# Patient Record
Sex: MALE | Race: WHITE | Employment: UNEMPLOYED | URBAN - METROPOLITAN AREA
[De-identification: names, ages, dates, MRNs, and addresses within clinical notes are randomized per-mention and may not be internally consistent; named-entity substitution may affect disease eponyms.]

---

## 2018-07-15 ENCOUNTER — HOSPITAL ENCOUNTER (INPATIENT)
Facility: CLINIC | Age: 28
LOS: 5 days | Discharge: HOME | End: 2018-07-20
Attending: EMERGENCY MEDICINE | Admitting: PSYCHIATRY & NEUROLOGY
Payer: MEDICAID

## 2018-07-15 DIAGNOSIS — F10.20 ALCOHOL USE DISORDER, MODERATE, DEPENDENCE (H): ICD-10-CM

## 2018-07-15 DIAGNOSIS — F10.930 ALCOHOL WITHDRAWAL SYNDROME WITHOUT COMPLICATION (H): ICD-10-CM

## 2018-07-15 DIAGNOSIS — A69.20 LYME DISEASE: ICD-10-CM

## 2018-07-15 DIAGNOSIS — F51.04 PSYCHOPHYSIOLOGICAL INSOMNIA: ICD-10-CM

## 2018-07-15 DIAGNOSIS — F11.20 OPIOID USE DISORDER, SEVERE, DEPENDENCE (H): ICD-10-CM

## 2018-07-15 DIAGNOSIS — F41.8 DEPRESSION WITH ANXIETY: ICD-10-CM

## 2018-07-15 DIAGNOSIS — F11.93 OPIOID WITHDRAWAL (H): Primary | ICD-10-CM

## 2018-07-15 PROBLEM — F11.10 OPIATE ABUSE, CONTINUOUS (H): Status: ACTIVE | Noted: 2018-07-15

## 2018-07-15 LAB
ALCOHOL BREATH TEST: 0 (ref 0–0.01)
AMPHETAMINES UR QL SCN: NEGATIVE
BARBITURATES UR QL: NEGATIVE
BENZODIAZ UR QL: NEGATIVE
CANNABINOIDS UR QL SCN: POSITIVE
COCAINE UR QL: NEGATIVE
ETHANOL UR QL SCN: NEGATIVE
OPIATES UR QL SCN: POSITIVE

## 2018-07-15 PROCEDURE — 25000132 ZZH RX MED GY IP 250 OP 250 PS 637: Performed by: EMERGENCY MEDICINE

## 2018-07-15 PROCEDURE — 99285 EMERGENCY DEPT VISIT HI MDM: CPT | Mod: 25 | Performed by: EMERGENCY MEDICINE

## 2018-07-15 PROCEDURE — 80320 DRUG SCREEN QUANTALCOHOLS: CPT | Performed by: FAMILY MEDICINE

## 2018-07-15 PROCEDURE — 25000125 ZZHC RX 250: Performed by: PSYCHIATRY & NEUROLOGY

## 2018-07-15 PROCEDURE — 86618 LYME DISEASE ANTIBODY: CPT | Performed by: EMERGENCY MEDICINE

## 2018-07-15 PROCEDURE — HZ2ZZZZ DETOXIFICATION SERVICES FOR SUBSTANCE ABUSE TREATMENT: ICD-10-PCS | Performed by: PSYCHIATRY & NEUROLOGY

## 2018-07-15 PROCEDURE — 12800012 ZZH R&B CD MH INTERMEDIATE ADULT

## 2018-07-15 PROCEDURE — 99284 EMERGENCY DEPT VISIT MOD MDM: CPT | Mod: Z6 | Performed by: EMERGENCY MEDICINE

## 2018-07-15 PROCEDURE — 25000132 ZZH RX MED GY IP 250 OP 250 PS 637: Performed by: PSYCHIATRY & NEUROLOGY

## 2018-07-15 PROCEDURE — 80307 DRUG TEST PRSMV CHEM ANLYZR: CPT | Performed by: FAMILY MEDICINE

## 2018-07-15 PROCEDURE — 82075 ASSAY OF BREATH ETHANOL: CPT | Performed by: EMERGENCY MEDICINE

## 2018-07-15 RX ORDER — BUPRENORPHINE 2 MG/1
4 TABLET SUBLINGUAL ONCE
Status: COMPLETED | OUTPATIENT
Start: 2018-07-16 | End: 2018-07-16

## 2018-07-15 RX ORDER — TRAZODONE HYDROCHLORIDE 50 MG/1
50 TABLET, FILM COATED ORAL
Status: DISCONTINUED | OUTPATIENT
Start: 2018-07-15 | End: 2018-07-20 | Stop reason: HOSPADM

## 2018-07-15 RX ORDER — BISACODYL 10 MG
10 SUPPOSITORY, RECTAL RECTAL DAILY PRN
Status: DISCONTINUED | OUTPATIENT
Start: 2018-07-15 | End: 2018-07-20 | Stop reason: HOSPADM

## 2018-07-15 RX ORDER — DOXYCYCLINE 100 MG/1
100 CAPSULE ORAL ONCE
Status: COMPLETED | OUTPATIENT
Start: 2018-07-15 | End: 2018-07-15

## 2018-07-15 RX ORDER — DIAZEPAM 5 MG
5-20 TABLET ORAL EVERY 30 MIN PRN
Status: DISCONTINUED | OUTPATIENT
Start: 2018-07-15 | End: 2018-07-19

## 2018-07-15 RX ORDER — IBUPROFEN 600 MG/1
600 TABLET, FILM COATED ORAL EVERY 6 HOURS PRN
Status: DISCONTINUED | OUTPATIENT
Start: 2018-07-15 | End: 2018-07-20 | Stop reason: HOSPADM

## 2018-07-15 RX ORDER — ALUMINA, MAGNESIA, AND SIMETHICONE 2400; 2400; 240 MG/30ML; MG/30ML; MG/30ML
30 SUSPENSION ORAL EVERY 4 HOURS PRN
Status: DISCONTINUED | OUTPATIENT
Start: 2018-07-15 | End: 2018-07-20 | Stop reason: HOSPADM

## 2018-07-15 RX ORDER — NALOXONE HYDROCHLORIDE 0.4 MG/ML
.1-.4 INJECTION, SOLUTION INTRAMUSCULAR; INTRAVENOUS; SUBCUTANEOUS
Status: DISCONTINUED | OUTPATIENT
Start: 2018-07-15 | End: 2018-07-20 | Stop reason: HOSPADM

## 2018-07-15 RX ORDER — HYDROXYZINE HYDROCHLORIDE 25 MG/1
25 TABLET, FILM COATED ORAL EVERY 4 HOURS PRN
Status: DISCONTINUED | OUTPATIENT
Start: 2018-07-15 | End: 2018-07-20 | Stop reason: HOSPADM

## 2018-07-15 RX ORDER — LANOLIN ALCOHOL/MO/W.PET/CERES
100 CREAM (GRAM) TOPICAL DAILY
Status: COMPLETED | OUTPATIENT
Start: 2018-07-16 | End: 2018-07-18

## 2018-07-15 RX ORDER — ATENOLOL 50 MG/1
50 TABLET ORAL DAILY PRN
Status: DISCONTINUED | OUTPATIENT
Start: 2018-07-15 | End: 2018-07-20 | Stop reason: HOSPADM

## 2018-07-15 RX ORDER — MULTIPLE VITAMINS W/ MINERALS TAB 9MG-400MCG
1 TAB ORAL DAILY
Status: DISCONTINUED | OUTPATIENT
Start: 2018-07-16 | End: 2018-07-20 | Stop reason: HOSPADM

## 2018-07-15 RX ORDER — ONDANSETRON 4 MG/1
4 TABLET, ORALLY DISINTEGRATING ORAL EVERY 6 HOURS PRN
Status: DISCONTINUED | OUTPATIENT
Start: 2018-07-15 | End: 2018-07-20 | Stop reason: HOSPADM

## 2018-07-15 RX ORDER — ACETAMINOPHEN 325 MG/1
650 TABLET ORAL EVERY 4 HOURS PRN
Status: DISCONTINUED | OUTPATIENT
Start: 2018-07-15 | End: 2018-07-20 | Stop reason: HOSPADM

## 2018-07-15 RX ORDER — DOXYCYCLINE 100 MG/1
100 CAPSULE ORAL 2 TIMES DAILY
Qty: 36 CAPSULE | Refills: 0 | Status: SHIPPED | OUTPATIENT
Start: 2018-07-15 | End: 2018-08-02

## 2018-07-15 RX ORDER — NICOTINE 21 MG/24HR
1 PATCH, TRANSDERMAL 24 HOURS TRANSDERMAL DAILY
Status: DISCONTINUED | OUTPATIENT
Start: 2018-07-15 | End: 2018-07-17 | Stop reason: ALTCHOICE

## 2018-07-15 RX ORDER — FOLIC ACID 1 MG/1
1 TABLET ORAL DAILY
Status: DISCONTINUED | OUTPATIENT
Start: 2018-07-16 | End: 2018-07-20 | Stop reason: HOSPADM

## 2018-07-15 RX ORDER — BUPRENORPHINE 2 MG/1
4 TABLET SUBLINGUAL 2 TIMES DAILY
Status: COMPLETED | OUTPATIENT
Start: 2018-07-16 | End: 2018-07-17

## 2018-07-15 RX ADMIN — DOXYCYCLINE HYCLATE 100 MG: 100 CAPSULE ORAL at 15:59

## 2018-07-15 RX ADMIN — DIAZEPAM 5 MG: 5 TABLET ORAL at 18:44

## 2018-07-15 RX ADMIN — ONDANSETRON 4 MG: 4 TABLET, ORALLY DISINTEGRATING ORAL at 18:45

## 2018-07-15 RX ADMIN — NICOTINE 1 PATCH: 21 PATCH, EXTENDED RELEASE TRANSDERMAL at 18:44

## 2018-07-15 ASSESSMENT — ACTIVITIES OF DAILY LIVING (ADL)
GROOMING: INDEPENDENT
AMBULATION: 0-->INDEPENDENT
RETIRED_EATING: 0-->INDEPENDENT
ORAL_HYGIENE: INDEPENDENT
TOILETING: 0-->INDEPENDENT
BATHING: 0-->INDEPENDENT
FALL_HISTORY_WITHIN_LAST_SIX_MONTHS: NO
COGNITION: 0 - NO COGNITION ISSUES REPORTED
RETIRED_COMMUNICATION: 0-->UNDERSTANDS/COMMUNICATES WITHOUT DIFFICULTY
DRESS: 0-->INDEPENDENT
SWALLOWING: 0-->SWALLOWS FOODS/LIQUIDS WITHOUT DIFFICULTY
NUMBER_OF_TIMES_PATIENT_HAS_FALLEN_WITHIN_LAST_SIX_MONTHS: 3
DRESS: SCRUBS (BEHAVIORAL HEALTH)
LAUNDRY: WITH SUPERVISION

## 2018-07-15 ASSESSMENT — ENCOUNTER SYMPTOMS
NAUSEA: 0
ABDOMINAL PAIN: 0
HALLUCINATIONS: 0
FEVER: 0
VOMITING: 0
RHINORRHEA: 1
CHILLS: 1
SHORTNESS OF BREATH: 0

## 2018-07-15 NOTE — ED PROVIDER NOTES
"  History     Chief Complaint   Patient presents with     Addiction Problem     \"I'm on the street and I'm trying to get clean\". Using $100-$120s worth per day IV. Last used at approx midnight. Drinks 4-5 talls cans of beer or at least a 1/5th of whiskey. Last at approx midnight also. Denies seizure history. THC daily. Uses any drug when available.     HPI  Colby Jaimes is a 27 year old male who presents seeking detox admission for both alcohol use and IV heroin use.  Patient states that he has been using daily IV heroin, 0.5-1 g per day for the last 4 weeks.  Patient has used IV heroin for years and was off of it for about 1 year before he relapsed 4 weeks ago when a friend of his .  His last use was about midnight last night, about 15 hours ago.  Patient is experiencing chills and body aches as well as runny nose.  He also reports that he has been using alcohol daily, 4 or 5 large beers a day and sometimes hard alcohol.  He denies history of seizure from alcohol withdrawal.  Patient denies any auditory or visual hallucinations and denies any suicidal or homicidal ideation.  He reports that about a month ago he was seen at [facility] for a bull's-eye rash.  It was suspected that he had Lyme disease and he was prescribed doxycycline, but he did not take the doxycycline because he was unable to afford the prescription.  Review of care everywhere reveals that the initial Lyme screen was negative, however given the incubation period, they recommended retesting in 4-6 weeks.  The initial Lyme screen was done on 2018.    I have reviewed the Medications, Allergies, Past Medical and Surgical History, and Social History in the [facility] system.    Past Medical History:   Diagnosis Date     Substance abuse        Past Surgical History:   Procedure Laterality Date     ENT SURGERY       ORTHOPEDIC SURGERY      facial        No family history on file.    Social History   Substance Use Topics     Smoking status: " "Current Every Day Smoker     Smokeless tobacco: Former User     Alcohol use Yes         Review of Systems   Constitutional: Positive for chills. Negative for fever.   HENT: Positive for rhinorrhea.    Respiratory: Negative for shortness of breath.    Cardiovascular: Negative for chest pain.   Gastrointestinal: Negative for abdominal pain, nausea and vomiting.   Psychiatric/Behavioral: Negative for hallucinations and suicidal ideas.   All other systems reviewed and are negative.      Physical Exam   BP: 131/76  Heart Rate: 59  Temp: 97  F (36.1  C)  Resp: 16  Height: 195.6 cm (6' 5\")  Weight: 81.6 kg (180 lb)  SpO2: 99 %      Physical Exam    GEN:  Alert, well developed, no acute distress  HEENT:  PERRL, EOMI, Mucous membranes are moist.   Cardio:  RRR, no murmur, radial pulses equal bilaterally  PULM:  Lungs clear, good air movement, no wheezes, rales   Abd:  Soft, normal bowel sounds, no focal tenderness  Back exam:  No CVA tenderness  Musculoskeletal:  normal range of motion, no lower extremity swelling or calf tenderness  Neuro:  Alert and oriented X3, Follows commands, moving all extremities spontaneously   Skin:  Warm, dry   Psych:  Denies suicidal ideation, homicidal ideation, auditory or visual hallucinations.        ED Course     ED Course     Procedures             Critical Care time:  none  Repeat Lyme screen was ordered.  This is pending.  Given that the patient initially presented with a bull's-eye rash and high suspicion for Lyme disease, I think he should be treated with doxycycline.  He was given a dose in the emergency department and I would recommend continuing the doxycycline 100 mg, twice daily while on the detox unit.  I would recommend that he complete a 21 day course.      Labs Ordered and Resulted from Time of ED Arrival Up to the Time of Departure from the ED   DRUG ABUSE SCREEN 6 CHEM DEP URINE ([facility]) - Abnormal; Notable for the following:        Result Value    Cannabinoids Qual Urine " Positive (*)     Opiates Qualitative Urine Positive (*)     All other components within normal limits   ALCOHOL BREATH TEST POCT - Normal   LYME DISEASE ASHLEY WITH REFLEX TO WB SERUM            Assessments & Plan (with Medical Decision Making)   Patient presents seeking admission to detox for opiate use and for alcohol use.  He has no other acute medical or mental health problems, however he likely has Lyme disease and I recommended 21 day course of doxycycline for this.  He will be admitted to detox.    I have reviewed the nursing notes.    I have reviewed the findings, diagnosis, plan and need for follow up with the patient.    New Prescriptions    DOXYCYCLINE (VIBRAMYCIN) 100 MG CAPSULE    Take 1 capsule (100 mg) by mouth 2 times daily for 18 days       Final diagnoses:   Opioid dependence with withdrawal (H)   Alcohol abuse       7/15/2018   [facility], [place], EMERGENCY DEPARTMENT     Marleny Kurtz MD  07/15/18 8549

## 2018-07-15 NOTE — PHARMACY-ADMISSION MEDICATION HISTORY
Admission medication history for the July 15, 2018 admission is complete.     Medication history interview sources: Patient    Medication compliance: N/A - patient not prescribed medications    Changes made to PTA medication list (reason)  Added: none  Deleted: none  Changed: none    Additional medication history information (including reliability of information, actions taken by pharmacist):  - Patient denies taking/being prescribed prescription medications and over-the-counter (OTC) products such as vitamins, sleep aids, etc.      Prior to Admission medications    Not on File       Time spent: 15 minutes    Medication history completed by: Marisol Ivan

## 2018-07-15 NOTE — ED NOTES
"ED to Behavioral Floor Handoff    SITUATION  Colby Jaimes is a 27 year old male who speaks English and lives is homeless alone The patient arrived in the ED by walking from street with a complaint of Addiction Problem (\"I'm on the street and I'm trying to get clean\". Using $100-$120s worth per day IV. Last used at approx midnight. Drinks 4-5 talls cans of beer or at least a 1/5th of whiskey. Last at approx midnight also. Denies seizure history. THC daily. Uses any drug when available.)  .The patient's current symptoms started/worsened 1 month ago and during this time the symptoms have gotten worse. Patient was sober for 8 years, and just started using again the last month everyday. Patient wants to get clean.   In the ED, pt was diagnosed with   Final diagnoses:   Opioid dependence with withdrawal (H)   Alcohol abuse        Initial vitals were: BP: 131/76  Heart Rate: 59  Temp: 97  F (36.1  C)  Resp: 16  Height: 195.6 cm (6' 5\")  Weight: 81.6 kg (180 lb)  SpO2: 99 %   --------  Is the patient diabetic? No   If yes, last blood glucose? --     If yes, was this treated in the ED? --  --------  Is the patient inebriated (ETOH) No or Impaired on other substances? Yes  MSSA done? N/A  Last MSSA score: --    Were withdrawal symptoms treated? N/A  Does the patient have a seizure history? No. If yes, date of most recent seizure--  --------  Is the patient patient experiencing suicidal ideation? denies current or recent suicidal ideation     Homicidal ideation? denies current or recent homicidal ideation or behaviors.    Self-injurious behavior/urges? denies current or recent self injurious behavior or ideation.  ------  Was pt aggressive in the ED No  Was a code called No  Is the pt now cooperative? Yes  -------  Meds given in ED:   Medications   doxycycline (VIBRAMYCIN) capsule 100 mg (100 mg Oral Given 7/15/18 4401)      Family present during ED course? No  Family currently present? No    BACKGROUND  Does the patient " "have a cognitive impairment or developmental disability? No  Allergies: No Known Allergies.   Social demographics are   Social History     Social History     Marital status: Single     Spouse name: N/A     Number of children: N/A     Years of education: N/A     Social History Main Topics     Smoking status: Current Every Day Smoker     Smokeless tobacco: Former User     Alcohol use Yes     Drug use: Yes     Special: Marijuana, Opiates, IV      Comment: \"anything else that is available\"     Sexual activity: Not Asked     Other Topics Concern     None     Social History Narrative     None        ASSESSMENT  Labs results   Labs Ordered and Resulted from Time of ED Arrival Up to the Time of Departure from the ED   DRUG ABUSE SCREEN 6 CHEM DEP URINE ([facility]) - Abnormal; Notable for the following:        Result Value    Cannabinoids Qual Urine Positive (*)     Opiates Qualitative Urine Positive (*)     All other components within normal limits   ALCOHOL BREATH TEST POCT - Normal   LYME DISEASE ASHLEY WITH REFLEX TO WB SERUM      Imaging Studies: No results found for this or any previous visit (from the past 24 hour(s)).   Most recent vital signs /76  Temp 97  F (36.1  C) (Oral)  Resp 16  Ht 1.956 m (6' 5\")  Wt 81.6 kg (180 lb)  SpO2 99%  BMI 21.34 kg/m2   Abnormal labs/tests/findings requiring intervention:---   Pain control: good  Nausea control: good    RECOMMENDATION  Are any infection precautions needed (MRSA, VRE, etc.)? No If yes, what infection? --  ---  Does the patient have mobility issues? independently. If yes, what device does the pt use? ---  ---  Is patient on 72 hour hold or commitment? No If on 72 hour hold, have hold and rights been given to patient? N/A  Are admitting orders written if after 10 p.m. ?N/A  Tasks needing to be completed:---     Caroline Alvarez   ascom-- [phone]   [phone]   [phone]  "

## 2018-07-16 PROBLEM — F11.93 OPIOID WITHDRAWAL (H): Status: ACTIVE | Noted: 2018-07-16

## 2018-07-16 PROBLEM — F11.20 OPIOID USE DISORDER, SEVERE, DEPENDENCE (H): Status: ACTIVE | Noted: 2018-07-16

## 2018-07-16 PROBLEM — F10.10 ALCOHOL ABUSE: Status: ACTIVE | Noted: 2018-07-16

## 2018-07-16 PROBLEM — F10.930 ALCOHOL WITHDRAWAL SYNDROME WITHOUT COMPLICATION (H): Status: ACTIVE | Noted: 2018-07-16

## 2018-07-16 PROBLEM — F10.20 ALCOHOL USE DISORDER, MODERATE, DEPENDENCE (H): Status: ACTIVE | Noted: 2018-07-16

## 2018-07-16 PROBLEM — F11.23 OPIOID DEPENDENCE WITH WITHDRAWAL (H): Status: ACTIVE | Noted: 2018-07-16

## 2018-07-16 LAB
ALBUMIN SERPL-MCNC: 3.6 G/DL (ref 3.4–5)
ALP SERPL-CCNC: 71 U/L (ref 40–150)
ALT SERPL W P-5'-P-CCNC: 19 U/L (ref 0–70)
ANION GAP SERPL CALCULATED.3IONS-SCNC: 5 MMOL/L (ref 3–14)
AST SERPL W P-5'-P-CCNC: 15 U/L (ref 0–45)
B BURGDOR IGG+IGM SER QL: 0.06 (ref 0–0.89)
BASOPHILS # BLD AUTO: 0 10E9/L (ref 0–0.2)
BASOPHILS NFR BLD AUTO: 0.7 %
BILIRUB SERPL-MCNC: 0.6 MG/DL (ref 0.2–1.3)
BUN SERPL-MCNC: 10 MG/DL (ref 7–30)
CALCIUM SERPL-MCNC: 8.9 MG/DL (ref 8.5–10.1)
CHLORIDE SERPL-SCNC: 109 MMOL/L (ref 94–109)
CO2 SERPL-SCNC: 29 MMOL/L (ref 20–32)
CREAT SERPL-MCNC: 0.89 MG/DL (ref 0.66–1.25)
DIFFERENTIAL METHOD BLD: NORMAL
EOSINOPHIL # BLD AUTO: 0.3 10E9/L (ref 0–0.7)
EOSINOPHIL NFR BLD AUTO: 6.3 %
ERYTHROCYTE [DISTWIDTH] IN BLOOD BY AUTOMATED COUNT: 12.4 % (ref 10–15)
FOLATE SERPL-MCNC: 19 NG/ML
GFR SERPL CREATININE-BSD FRML MDRD: >90 ML/MIN/1.7M2
GGT SERPL-CCNC: 17 U/L (ref 0–75)
GLUCOSE SERPL-MCNC: 83 MG/DL (ref 70–99)
HCT VFR BLD AUTO: 42.9 % (ref 40–53)
HGB BLD-MCNC: 14.2 G/DL (ref 13.3–17.7)
IMM GRANULOCYTES # BLD: 0 10E9/L (ref 0–0.4)
IMM GRANULOCYTES NFR BLD: 0.2 %
LYMPHOCYTES # BLD AUTO: 2.7 10E9/L (ref 0.8–5.3)
LYMPHOCYTES NFR BLD AUTO: 49.9 %
MCH RBC QN AUTO: 30.1 PG (ref 26.5–33)
MCHC RBC AUTO-ENTMCNC: 33.1 G/DL (ref 31.5–36.5)
MCV RBC AUTO: 91 FL (ref 78–100)
MONOCYTES # BLD AUTO: 0.5 10E9/L (ref 0–1.3)
MONOCYTES NFR BLD AUTO: 10.1 %
NEUTROPHILS # BLD AUTO: 1.8 10E9/L (ref 1.6–8.3)
NEUTROPHILS NFR BLD AUTO: 32.8 %
NRBC # BLD AUTO: 0 10*3/UL
NRBC BLD AUTO-RTO: 0 /100
PLATELET # BLD AUTO: 252 10E9/L (ref 150–450)
POTASSIUM SERPL-SCNC: 4.3 MMOL/L (ref 3.4–5.3)
PROT SERPL-MCNC: 7.1 G/DL (ref 6.8–8.8)
RBC # BLD AUTO: 4.72 10E12/L (ref 4.4–5.9)
SODIUM SERPL-SCNC: 143 MMOL/L (ref 133–144)
T4 FREE SERPL-MCNC: 0.97 NG/DL (ref 0.76–1.46)
TSH SERPL DL<=0.005 MIU/L-ACNC: 0.23 MU/L (ref 0.4–4)
VIT B12 SERPL-MCNC: 465 PG/ML (ref 193–986)
WBC # BLD AUTO: 5.4 10E9/L (ref 4–11)

## 2018-07-16 PROCEDURE — 84439 ASSAY OF FREE THYROXINE: CPT | Performed by: PSYCHIATRY & NEUROLOGY

## 2018-07-16 PROCEDURE — 82977 ASSAY OF GGT: CPT | Performed by: PSYCHIATRY & NEUROLOGY

## 2018-07-16 PROCEDURE — 25000132 ZZH RX MED GY IP 250 OP 250 PS 637: Performed by: PHYSICIAN ASSISTANT

## 2018-07-16 PROCEDURE — 36415 COLL VENOUS BLD VENIPUNCTURE: CPT | Performed by: PSYCHIATRY & NEUROLOGY

## 2018-07-16 PROCEDURE — 87491 CHLMYD TRACH DNA AMP PROBE: CPT | Performed by: PHYSICIAN ASSISTANT

## 2018-07-16 PROCEDURE — 99223 1ST HOSP IP/OBS HIGH 75: CPT | Mod: AI | Performed by: PSYCHIATRY & NEUROLOGY

## 2018-07-16 PROCEDURE — 99222 1ST HOSP IP/OBS MODERATE 55: CPT | Performed by: PHYSICIAN ASSISTANT

## 2018-07-16 PROCEDURE — 85025 COMPLETE CBC W/AUTO DIFF WBC: CPT | Performed by: PSYCHIATRY & NEUROLOGY

## 2018-07-16 PROCEDURE — 87591 N.GONORRHOEAE DNA AMP PROB: CPT | Performed by: PHYSICIAN ASSISTANT

## 2018-07-16 PROCEDURE — 25000132 ZZH RX MED GY IP 250 OP 250 PS 637: Performed by: PSYCHIATRY & NEUROLOGY

## 2018-07-16 PROCEDURE — 87389 HIV-1 AG W/HIV-1&-2 AB AG IA: CPT | Performed by: PSYCHIATRY & NEUROLOGY

## 2018-07-16 PROCEDURE — 99207 ZZC CONSULT E&M CHANGED TO INITIAL LEVEL: CPT | Performed by: PHYSICIAN ASSISTANT

## 2018-07-16 PROCEDURE — 12800012 ZZH R&B CD MH INTERMEDIATE ADULT

## 2018-07-16 PROCEDURE — 84443 ASSAY THYROID STIM HORMONE: CPT | Performed by: PSYCHIATRY & NEUROLOGY

## 2018-07-16 PROCEDURE — 82746 ASSAY OF FOLIC ACID SERUM: CPT | Performed by: PSYCHIATRY & NEUROLOGY

## 2018-07-16 PROCEDURE — 86803 HEPATITIS C AB TEST: CPT | Performed by: PSYCHIATRY & NEUROLOGY

## 2018-07-16 PROCEDURE — 80053 COMPREHEN METABOLIC PANEL: CPT | Performed by: PSYCHIATRY & NEUROLOGY

## 2018-07-16 PROCEDURE — 82607 VITAMIN B-12: CPT | Performed by: PSYCHIATRY & NEUROLOGY

## 2018-07-16 RX ORDER — DOXYCYCLINE 100 MG/1
100 CAPSULE ORAL EVERY 12 HOURS SCHEDULED
Status: DISCONTINUED | OUTPATIENT
Start: 2018-07-16 | End: 2018-07-20 | Stop reason: HOSPADM

## 2018-07-16 RX ADMIN — DIAZEPAM 10 MG: 5 TABLET ORAL at 08:44

## 2018-07-16 RX ADMIN — ALUMINUM HYDROXIDE, MAGNESIUM HYDROXIDE, AND DIMETHICONE 30 ML: 400; 400; 40 SUSPENSION ORAL at 12:16

## 2018-07-16 RX ADMIN — DOXYCYCLINE HYCLATE 100 MG: 100 CAPSULE ORAL at 10:45

## 2018-07-16 RX ADMIN — BUPRENORPHINE HCL 4 MG: 2 TABLET SUBLINGUAL at 08:09

## 2018-07-16 RX ADMIN — HYDROXYZINE HYDROCHLORIDE 25 MG: 25 TABLET, FILM COATED ORAL at 12:16

## 2018-07-16 RX ADMIN — DOXYCYCLINE HYCLATE 100 MG: 100 CAPSULE ORAL at 20:24

## 2018-07-16 RX ADMIN — HYDROXYZINE HYDROCHLORIDE 25 MG: 25 TABLET, FILM COATED ORAL at 20:49

## 2018-07-16 RX ADMIN — IBUPROFEN 600 MG: 600 TABLET ORAL at 16:23

## 2018-07-16 RX ADMIN — MULTIPLE VITAMINS W/ MINERALS TAB 1 TABLET: TAB at 08:09

## 2018-07-16 RX ADMIN — BUPRENORPHINE HCL 4 MG: 2 TABLET SUBLINGUAL at 20:24

## 2018-07-16 RX ADMIN — BUPRENORPHINE HCL 4 MG: 2 TABLET SUBLINGUAL at 00:24

## 2018-07-16 RX ADMIN — TRAZODONE HYDROCHLORIDE 50 MG: 50 TABLET ORAL at 22:34

## 2018-07-16 RX ADMIN — BUPRENORPHINE HCL 4 MG: 2 TABLET SUBLINGUAL at 04:26

## 2018-07-16 RX ADMIN — Medication 100 MG: at 08:09

## 2018-07-16 RX ADMIN — FOLIC ACID 1 MG: 1 TABLET ORAL at 08:09

## 2018-07-16 RX ADMIN — HYDROXYZINE HYDROCHLORIDE 25 MG: 25 TABLET, FILM COATED ORAL at 16:23

## 2018-07-16 RX ADMIN — NICOTINE 1 PATCH: 21 PATCH, EXTENDED RELEASE TRANSDERMAL at 08:10

## 2018-07-16 ASSESSMENT — ACTIVITIES OF DAILY LIVING (ADL)
DRESS: INDEPENDENT
ORAL_HYGIENE: INDEPENDENT
GROOMING: INDEPENDENT

## 2018-07-16 NOTE — PLAN OF CARE
Behavioral Team Discussion: (7/16/2018)    Continued Stay Criteria/Rationale: Patient admitted for opiate Use Disorder.  Plan: The following services will be provided to the patient; psychiatric assessment, medication management, therapeutic milieu, individual and group support, and skills groups.   Participants: 3A Provider: Dr. Juan David Morocho MD; 3A RN's: Corbin Trejo RN; 3A CM's: Jesica Flannery .  Summary/Recommendation: Providers will assess today for treatment recommendations, discharge planning, and aftercare plans. CM will meet with pt for discharge planning.   Medical/Physical: Deferred (see medical notes).  Precautions:   Behavioral Orders   Procedures     Code 1 - Restrict to Unit     Routine Programming     As clinically indicated     Status 15     Every 15 minutes.     Withdrawal precautions     Rationale for change in precautions or plan: N/A  Progress: No Change.

## 2018-07-16 NOTE — PROGRESS NOTES
CLINICAL NUTRITION SERVICES - ASSESSMENT NOTE     Nutrition Prescription    RECOMMENDATIONS FOR MDs/PROVIDERS TO ORDER:  Pt expressed concern that he believes his anxiety and depression has contributed to his lack of appetite (along with drug use).    Malnutrition Status:    Patient does not meet two of the above criteria necessary for diagnosing malnutrition    Recommendations already ordered by Registered Dietitian (RD):  None at this time    Future/Additional Recommendations:  Monitor wt trends, po intakes, and appetite. If po intakes/wt declines, offer oral nutritional supplement to help increase intakes.      REASON FOR ASSESSMENT  Colby Jaimes is a/an 27 year old male assessed by the dietitian for Admission Nutrition Risk Screen for reduced oral intake over the last month    NUTRITION HISTORY  - Pt reports that appetite as been good when using marijuana but that appetite declines with other drug and alcohol use. Reports that for the past 1.5-2 months when his use has been significantly more his intakes have decreased to ~75% of normal d/t decreased appetite. He also thinks his appetite and depression has played into decreased appetite. Ongoing constipation.  - Reports taking MVI and B complex ~6 mo ago prior to heavy drug use  - Has been homeless for ~1.5 months PTA and said that he's been utilizing soup raquel, food lim, and food shelves and gets adequate amounts of food. Usual intakes are 1 meal/day at dinner. Pt said he tries to snack on chips and fruit in the morning/afternoon but that his drug use has suppressed his appetite.     CURRENT NUTRITION ORDERS  Diet: Regular  Intake/Tolerance: Ongoing constipation; reports episodes of vomiting yesterday. Reports ate muffin, milk, and OJ this morning but then has had stomach pain. Said he communicated this to provider. Pt believes his anxiety and depression are also contributing to lack of appetite (pt was not sure, though, if he had communicated these  "things to provider). Per chart review, pt with poor eating - did eat 1/2 of dinner 7/15.    LABS  Labs reviewed    MEDICATIONS  Folic acid  Thiamin  Thera-Vit-M  Bowel meds prn    ANTHROPOMETRICS  Height: 195.6 cm (6' 5\")  Most Recent Weight: 84.4 kg (186 lb)    IBW: 94.5 kg (89% IBW)  BMI: Normal BMI  Weight History: Pt reports UBW 190s#. Per CareEverywhere, pt 160# on 6/7/18. Pt confirmed this was likely accurate. Admit wt of 81.6 kg on 7/15; however, another wt reading from 7/15 listed below.  Wt Readings from Last 10 Encounters:   07/15/18 84.4 kg (186 lb)     Dosing Weight: 84 kg (actual)    ASSESSED NUTRITION NEEDS  Estimated Energy Needs: 7920-8912+ kcals/day (25 - 30 kcals/kg)  Justification: Maintenance  Estimated Protein Needs: 67-84 grams protein/day (0.8 - 1 grams of pro/kg)  Justification: Maintenance  Estimated Fluid Needs: 1 mL/kcal, or per provider  Justification: Maintenance    PHYSICAL FINDINGS  See malnutrition section below  Per chart: Opioid and alcohol withdrawal; pt homeless    MALNUTRITION  % Intake: < 75% for >/= 1 month (non-severe)  % Weight Loss: Weight loss does not meet criteria, though difficult to assess given both wt readings from 7/15  Subcutaneous Fat Loss: None observed  Muscle Loss: None observed  Fluid Accumulation/Edema: None noted  Malnutrition Diagnosis: Patient does not meet two of the above criteria necessary for diagnosing malnutrition    NUTRITION DIAGNOSIS  Inadequate oral intake related to recent drug use suppressing appetite and current c/o stomach pain as evidenced by minimal intakes PTA per diet recall.      INTERVENTIONS  Implementation  Nutrition Education: Encouraged meals TID and notified pt snacks are available on unit  Discussed how to order meals  Encouraged pt to discuss with provider his concerns about his anxiety and depression    Goals  1. Patient to consume % of nutritionally adequate meal trays TID, or the equivalent with supplements/snacks.    2. " Wt maintenance; wt to remain above current wt of 84.4 kg.      Monitoring/Evaluation  Progress toward goals will be monitored and evaluated per protocol.    Jodi Chrisland  Nutrition Services  Unit pgr: [phone]

## 2018-07-16 NOTE — PROGRESS NOTES
Met with Pt to initiate discharge planning.  Pt is requesting referral to treatment.  He states he had a Rule 25 about 3 weeks ago but is unable to recall where exactly.  PT has medical assistance and will need [facility] funding.  Pt is requesting [facility] as he is seeking placement outside the city.  CM will attempt to locate rule 25 or complete a new one.

## 2018-07-16 NOTE — CONSULTS
[facility]  Internal Medicine Consult     Colby Jaimes MRN# [id number]   Age: 27 year old YOB: 1990     Date of Admission: 7/15/2018  Date of Consult:  7/16/2018    Primary Care Provider: No Ref-Primary, Physician    Requesting Service: Dr. Davenport   Reason for Consult: General Medical Evaluation      SUBJECTIVE   CC:   Possible Lyme disease    Assessment and Plan/Recommendations:   Colby Jaimes is a 27 year old male with history of etoh abuse, depression, anxiety, IV heroin abuse who was admitted to station 3A with acute withdrawal.      IV heroin, alcohol abuse, depression, anxiety: Was sober for a period of time and relapsed about 2 months ago  - Management per psych    Lyme disease?: Per patient diagnosed at outside facility a few months ago but did not follow up with antibiotics. Started on doxy per ED. Was not started per primary team  - Resume 21 day doxycycline course    STI testing: requested per patient  - Gonorrhea, chlamydia ordered. Medicine will follow     Subclinical hyperthyroidism: TSH 0.23, but T4 normal  - Suggest follow up OP TSH/T4 in 6 weeks    Medicine will peripherally follow STI testing. Please contact if future questions or concerns arise. Thank you for the opportunity to be a part of this patient's care.      Sidra Johnson  Internal Medicine [facility] Hospitalist  [phone]  July 16, 2018         HPI:   Colby Jaimes is a 27 year old male with history of etoh abuse, depression, anxiety, IV heroin abuse who was admitted to station 3A with acute withdrawal.    Patient reports relapse about two months ago. Since then he has overdosed three times. Current symptoms include insomnia, abdominal cramps, shakes. Patient reports he was diagnosed with Lyme disease months ago but did not fill the prescription. Reports chronic muscle pain. No rashes. Injects into the right AC. Reuses needles. Denies recent illness, fever, headache, confusion,  "dizziness, chest pain, dyspnea, cough, urinary symptoms, change in bowels, peripheral edema, or new onset joint pain     Past Medical History:     Past Medical History:   Diagnosis Date     Substance abuse       Reviewed and updated in [facility].     Past Surgical History:      Past Surgical History:   Procedure Laterality Date     ENT SURGERY       ORTHOPEDIC SURGERY      facial          Social History:   Alcohol use: Yes  Elicit drug use: As above       Family History:     Family History   Problem Relation Age of Onset     Crohn Disease Mother      Alcoholism Father      Irritable Bowel Syndrome Brother          Allergies:   No Known Allergies      Medications:   Reviewed. Please see MAR     Review of Systems:   10 point ROS of systems including Constitutional, Eyes, Respiratory, Cardiovascular, Gastroenterology, Genitourinary, Integumentary, Muscularskeletal, Psychiatric were all negative except for pertinent positives noted in my HPI.      OBJECTIVE   Physical Exam:   Vitals were reviewed  Blood pressure 115/78, pulse 55, temperature 97  F (36.1  C), temperature source Oral, resp. rate 16, height 1.956 m (6' 5\"), weight 84.4 kg (186 lb), SpO2 99 %.  General: Alert, NAD, anxious   HEENT: Anicteric sclera, EOMI, membranes moist  Cardiovascular: RRR, S1S2. No murmur noted  Lungs: CTAB without wheezing or crackles   GI: Abdomen soft, non-tender with bowel sounds present. No guarding or rebound present  Vascular: No peripheral edema, distal pulses palpable  Neurologic: AAO X 3, no focal deficits  Neuropsychiatric: Per psych  Skin: No jaundice, rashes, or lesions. Track marks in the right AC, no s/s infection         Data:        No results found for: NA No results found for: CHLORIDE No results found for: BUN   No results found for: POTASSIUM No results found for: CO2 No results found for: CR     Lab Results   Component Value Date    WBC 5.4 07/16/2018    HGB 14.2 07/16/2018    HCT 42.9 07/16/2018    MCV 91 07/16/2018    "  07/16/2018     Lab Results   Component Value Date    WBC 5.4 07/16/2018

## 2018-07-16 NOTE — PROGRESS NOTES
07/15/18 2012   Patient Belongings   Did you bring any home meds/supplements to the hospital?  Yes   Disposition of meds  Sent to security/pharmacy per site process   Patient Belongings other (see comments)   Disposition of Belongings Other (see comment)  (storage bin, locked drawer, security)   Belongings Search Yes   Clothing Search Yes   Second Staff nima dye   storage bin: cap, belt, jacket, scissors, cigarettes, 2 lighters, sewing needles, dental floss, miscellaneous restricted clothing items  Brown backpack in storage room  Locked drawer: cell phone, wallet  [id number]: [place] ID, ss card, EBT, mastercard  Med to [id number]  A               Admission:  I am responsible for any personal items that are not sent to the safe or pharmacy.  [facility] is not responsible for loss, theft or damage of any property in my possession.    Signature:  _________________________________ Date: _______  Time: _____                                              Staff Signature:  ____________________________ Date: ________  Time: _____      2nd Staff person, if patient is unable/unwilling to sign:    Signature: ________________________________ Date: ________  Time: _____     Discharge:  [facility] has returned all of my personal belongings:    Signature: _________________________________ Date: ________  Time: _____                                          Staff Signature:  ____________________________ Date: ________  Time: _____

## 2018-07-16 NOTE — H&P
Admitted:     07/15/2018      CHIEF COMPLAINT:  A 27-year-old man admitted for detoxification from IV heroin.      HISTORY OF PRESENT ILLNESS:  The patient is a 27-year-old man who presents for detoxification from heroin.  He has been using half gram to a gram per day.  He has also been drinking 4-5 tall cans of beer or at least a fifth of whiskey every day.  He states that he had been sober for over a year.  He relocated from [place] to [place] to live with one of his friends.  His friend ended up dying via overdose recently.  Since then, he relapsed, started using again.  He indicates that he did try to complete a Rule 25 and get in for treatment; however, with finding this difficult, eventually someone suggested he come in for detox and seek assistance on referrals and he is here now.  The patient is very seeking of help.  He wants some assistance and hopes to get into [facility] for treatment.  The patient indicates that he was recently seen at [facility] for a bull's eye rash that was suspected to be Lyme's disease.  He was prescribed doxycycline, but did not take it because he could not afford the prescription.  In addition, he indicates that he believes he might have recently contracted hepatitis C.  He also reports having HPV and anemia.      PAST PSYCHIATRIC HISTORY:  The patient denies ever being in treatment before.  Indicates that he has been in detox multiple times in [place].  States that he came here for help because [place] does not have any treatment that is available if your are not able to pay.        PAST MEDICAL HISTORY:  Relevant past medical history is noted in the HPI.        SUBSTANCE HISTORY:  As noted in HPI.      PHYSICAL REVIEW OF SYSTEMS:  The patient reports feeling fatigued, depressed about his current situation, in addition to other problems noted in HPI.  Otherwise, denies problems on 10-point review of systems.      FAMILY HISTORY:  The patient reports that father had significant  alcohol use issues.  Mother and brothers have problems with heroin use.      SOCIAL HISTORY:  Relevant social history is noted in HPI.      ALLERGIES:  NO KNOWN DRUG ALLERGIES.        PRIOR TO ADMISSION MEDICATIONS:  None.      LABORATORY DATA:  Comprehensive metabolic panel, low TSH of 0.23, free T4 is within normal limits.  Vitamin B12 is within normal limits.  CBC with differential within normal limits.  Lyme screen was 0.06, which does not confirm presence of antibodies.  Alcohol breath was negative.  Urine toxicology is positive for opiates and cannabinoids.      VITAL SIGNS:  Temperature 96.8, pulse 55, respiratory rate is 18, blood pressure is 121/84, oxygen saturation 99% on room air.      PHYSICAL EXAMINATION:  I reviewed physical exam as documented by Internal Medicine consult by Sidra Johnson dated 07/16/2018.  I have no additional findings at this time.      MENTAL STATUS EXAMINATION:  The patient is in bed.  He is somewhat sleepy, but he is oriented to person, place and date.  He is cooperative throughout the interview.  He is unkempt, wearing hospital scrubs.  He has good eye contact.  Speech is normal in rate and volume.  Language is intact for word usage and sentence structure.  Mood is somewhat depressed.  Affect is congruent to mood.  Thought process is linear and goal oriented.  Associations are intact.  Thought content is currently negative for suicidal thoughts, homicidal thoughts or signs of psychosis.  He has no psychomotor agitation or retardation.  Muscle strength and tone and gait and station are within normal limits.  Recent and remote memory are intact.  Fund of knowledge is adequate.  Attention and concentration are intact.  Insight is fair, judgment is fair.      DIAGNOSES:   1.  Alcohol withdrawal.   2.  Opiate withdrawal.   3.  Alcohol use disorder, moderate.   4.  Opiate use disorder, severe.   5.  Cigarette nicotine dependence, currently withdrawal.   6.  Suspected early Lyme's  disease.      PLAN:   1.  [facility] protocol for alcohol withdrawal using Valium to treat withdrawal symptoms.   2.  Buprenorphine 4 mg twice daily for opiate withdrawal.   3.  The patient to work with  on the unit for referral to chemical dependency treatment at [facility].   4.  Disposition:  The patient to discharge from hospital when bed available at treatment and out of detoxification.         DAKOTA COVARRUBIAS MD             D: 2018   T: 2018   MT: TAMAR      Name:     CARLYN MCCANN   MRN:      [id number]        Account:      [id number]   :      1990        Admitted:     07/15/2018                   Document: [id number]
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
## 2018-07-17 LAB
C TRACH DNA SPEC QL NAA+PROBE: NEGATIVE
HCV AB SERPL QL IA: NONREACTIVE
HIV 1+2 AB+HIV1 P24 AG SERPL QL IA: NONREACTIVE
N GONORRHOEA DNA SPEC QL NAA+PROBE: NEGATIVE
SPECIMEN SOURCE: NORMAL
SPECIMEN SOURCE: NORMAL

## 2018-07-17 PROCEDURE — 12800012 ZZH R&B CD MH INTERMEDIATE ADULT

## 2018-07-17 PROCEDURE — 25000132 ZZH RX MED GY IP 250 OP 250 PS 637: Performed by: PSYCHIATRY & NEUROLOGY

## 2018-07-17 PROCEDURE — 99232 SBSQ HOSP IP/OBS MODERATE 35: CPT | Performed by: PSYCHIATRY & NEUROLOGY

## 2018-07-17 PROCEDURE — 25000132 ZZH RX MED GY IP 250 OP 250 PS 637: Performed by: PHYSICIAN ASSISTANT

## 2018-07-17 RX ORDER — BUPRENORPHINE 2 MG/1
2 TABLET SUBLINGUAL DAILY
Status: DISCONTINUED | OUTPATIENT
Start: 2018-07-20 | End: 2018-07-20 | Stop reason: HOSPADM

## 2018-07-17 RX ORDER — BUPRENORPHINE 2 MG/1
2 TABLET SUBLINGUAL 2 TIMES DAILY
Status: COMPLETED | OUTPATIENT
Start: 2018-07-19 | End: 2018-07-19

## 2018-07-17 RX ORDER — BUPRENORPHINE 2 MG/1
2 TABLET SUBLINGUAL 3 TIMES DAILY
Status: COMPLETED | OUTPATIENT
Start: 2018-07-18 | End: 2018-07-18

## 2018-07-17 RX ORDER — LANOLIN ALCOHOL/MO/W.PET/CERES
6 CREAM (GRAM) TOPICAL
Status: DISCONTINUED | OUTPATIENT
Start: 2018-07-17 | End: 2018-07-20 | Stop reason: HOSPADM

## 2018-07-17 RX ORDER — CLONIDINE HYDROCHLORIDE 0.1 MG/1
0.1 TABLET ORAL 2 TIMES DAILY
Status: DISCONTINUED | OUTPATIENT
Start: 2018-07-17 | End: 2018-07-18

## 2018-07-17 RX ADMIN — Medication 100 MG: at 08:08

## 2018-07-17 RX ADMIN — HYDROXYZINE HYDROCHLORIDE 25 MG: 25 TABLET, FILM COATED ORAL at 16:35

## 2018-07-17 RX ADMIN — MULTIPLE VITAMINS W/ MINERALS TAB 1 TABLET: TAB at 08:08

## 2018-07-17 RX ADMIN — NICOTINE 1 PATCH: 21 PATCH, EXTENDED RELEASE TRANSDERMAL at 08:08

## 2018-07-17 RX ADMIN — FOLIC ACID 1 MG: 1 TABLET ORAL at 08:08

## 2018-07-17 RX ADMIN — NICOTINE POLACRILEX 8 MG: 4 GUM, CHEWING ORAL at 17:34

## 2018-07-17 RX ADMIN — ACETAMINOPHEN 650 MG: 325 TABLET, FILM COATED ORAL at 12:44

## 2018-07-17 RX ADMIN — HYDROXYZINE HYDROCHLORIDE 25 MG: 25 TABLET, FILM COATED ORAL at 20:37

## 2018-07-17 RX ADMIN — DOXYCYCLINE HYCLATE 100 MG: 100 CAPSULE ORAL at 20:36

## 2018-07-17 RX ADMIN — BUPRENORPHINE HCL 4 MG: 2 TABLET SUBLINGUAL at 08:08

## 2018-07-17 RX ADMIN — HYDROXYZINE HYDROCHLORIDE 25 MG: 25 TABLET, FILM COATED ORAL at 12:44

## 2018-07-17 RX ADMIN — NICOTINE POLACRILEX 8 MG: 4 GUM, CHEWING ORAL at 20:35

## 2018-07-17 RX ADMIN — CLONIDINE HYDROCHLORIDE 0.1 MG: 0.1 TABLET ORAL at 11:30

## 2018-07-17 RX ADMIN — BUPRENORPHINE HCL 4 MG: 2 TABLET SUBLINGUAL at 20:36

## 2018-07-17 RX ADMIN — IBUPROFEN 600 MG: 600 TABLET ORAL at 11:30

## 2018-07-17 RX ADMIN — CLONIDINE HYDROCHLORIDE 0.1 MG: 0.1 TABLET ORAL at 20:36

## 2018-07-17 RX ADMIN — DOXYCYCLINE HYCLATE 100 MG: 100 CAPSULE ORAL at 08:08

## 2018-07-17 RX ADMIN — HYDROXYZINE HYDROCHLORIDE 25 MG: 25 TABLET, FILM COATED ORAL at 08:33

## 2018-07-17 ASSESSMENT — ACTIVITIES OF DAILY LIVING (ADL)
ORAL_HYGIENE: INDEPENDENT
DRESS: INDEPENDENT
GROOMING: INDEPENDENT

## 2018-07-17 NOTE — PLAN OF CARE
"Problem: Patient Care Overview  Goal: Plan of Care/Patient Progress Review  Outcome: Improving  Patient experienced a largely positive on Herrera 3A. Clearer of mind than in previous days, patient spoke with increased clarity about tapering off of, rather than maintaining, Suboxone as a discharge medication. He also expressed increased desire for post-detoxification treatment. Patient also took first dose of new supportive medication, yet in the early afternoon approached this worker, stating that his anxiety and \"pounding heart\" symptoms had not significantly abated. Patient expressed a desire for a higher dose of said medication.   This worker recommended waiting a few more hours, along with a brief nap, to assess its effectiveness.   This worker reminded patient that he will be able to discuss related issues directly with attending MD tomorrow nirav, and that additional medication options were already in place until then.  Patient agreed with plan, and continued his day with visibly well-managed symptoms, remaining pleasant of demeanor and earnest of intent.  Will continue to monitor as prudent.      "

## 2018-07-17 NOTE — PROGRESS NOTES
Met with Pt and completed assessment.  Faxed [facility] for funding for [facility].  Faxed [facility] for review for admission.  HALEY's in chart.

## 2018-07-17 NOTE — PROGRESS NOTES
[facility], [place]   Psychiatric Progress Note  Hospital Day: 2        Interim History:   The patient's care was discussed with the treatment team during the daily team meeting and/or staff's chart notes were reviewed.  Staff report patient is more alert and conversational today.    Upon interview, the patient gave me a very thorough back story. He talked about his childhood, his relationship with his parents, and his siblings. His brother committed suicide when the patient was younger. His father  due to complications from alcohol use. The patient had been remaining sober for some time, even after the accidental overdose of his friend, but eventually returned to use. He reported that he ahs been through short buprenorphine tapers that have helped with his sobriety. He also indicates need for treatment of his anxiety and ADHD, but understands I cannot prescribe him controlled substances.    Psychiatric Symptoms: anxiety, distractibility     Medication side effects reported: denies    Other issues reported by patient: none         Medications:       [START ON 2018] buprenorphine  2 mg Sublingual TID     [START ON 2018] buprenorphine  2 mg Sublingual BID     [START ON 2018] buprenorphine  2 mg Sublingual Daily     buprenorphine  4 mg Sublingual BID     cloNIDine  0.1 mg Oral BID     doxycycline  100 mg Oral Q12H WANDA     folic acid  1 mg Oral Daily     multivitamin, therapeutic with minerals  1 tablet Oral Daily     nicotine  1 patch Transdermal Daily     nicotine   Transdermal Q8H     nicotine   Transdermal Daily     thiamine  100 mg Oral Daily          Allergies:   No Known Allergies       Labs:     Recent Results (from the past 48 hour(s))   CBC with platelets differential    Collection Time: 18  8:17 AM   Result Value Ref Range    WBC 5.4 4.0 - 11.0 10e9/L    RBC Count 4.72 4.4 - 5.9 10e12/L    Hemoglobin 14.2 13.3 - 17.7 g/dL    Hematocrit 42.9 40.0 - 53.0 %     MCV 91 78 - 100 fl    MCH 30.1 26.5 - 33.0 pg    MCHC 33.1 31.5 - 36.5 g/dL    RDW 12.4 10.0 - 15.0 %    Platelet Count 252 150 - 450 10e9/L    Diff Method Automated Method     % Neutrophils 32.8 %    % Lymphocytes 49.9 %    % Monocytes 10.1 %    % Eosinophils 6.3 %    % Basophils 0.7 %    % Immature Granulocytes 0.2 %    Nucleated RBCs 0 0 /100    Absolute Neutrophil 1.8 1.6 - 8.3 10e9/L    Absolute Lymphocytes 2.7 0.8 - 5.3 10e9/L    Absolute Monocytes 0.5 0.0 - 1.3 10e9/L    Absolute Eosinophils 0.3 0.0 - 0.7 10e9/L    Absolute Basophils 0.0 0.0 - 0.2 10e9/L    Abs Immature Granulocytes 0.0 0 - 0.4 10e9/L    Absolute Nucleated RBC 0.0    GGT    Collection Time: 07/16/18  8:17 AM   Result Value Ref Range    GGT 17 0 - 75 U/L   Comprehensive metabolic panel    Collection Time: 07/16/18  8:17 AM   Result Value Ref Range    Sodium 143 133 - 144 mmol/L    Potassium 4.3 3.4 - 5.3 mmol/L    Chloride 109 94 - 109 mmol/L    Carbon Dioxide 29 20 - 32 mmol/L    Anion Gap 5 3 - 14 mmol/L    Glucose 83 70 - 99 mg/dL    Urea Nitrogen 10 7 - 30 mg/dL    Creatinine 0.89 0.66 - 1.25 mg/dL    GFR Estimate >90 >60 mL/min/1.7m2    GFR Estimate If Black >90 >60 mL/min/1.7m2    Calcium 8.9 8.5 - 10.1 mg/dL    Bilirubin Total 0.6 0.2 - 1.3 mg/dL    Albumin 3.6 3.4 - 5.0 g/dL    Protein Total 7.1 6.8 - 8.8 g/dL    Alkaline Phosphatase 71 40 - 150 U/L    ALT 19 0 - 70 U/L    AST 15 0 - 45 U/L   TSH with free T4 reflex and/or T3 as indicated    Collection Time: 07/16/18  8:17 AM   Result Value Ref Range    TSH 0.23 (L) 0.40 - 4.00 mU/L   Folate    Collection Time: 07/16/18  8:17 AM   Result Value Ref Range    Folate 19.0 >5.4 ng/mL   Vitamin B12    Collection Time: 07/16/18  8:17 AM   Result Value Ref Range    Vitamin B12 465 193 - 986 pg/mL   Hepatitis C Screen Reflex to HCV RNA Quant and Genotype    Collection Time: 07/16/18  8:17 AM   Result Value Ref Range    Hepatitis C Antibody Nonreactive NR^Nonreactive   HIV Antigen Antibody  "Combo    Collection Time: 07/16/18  8:17 AM   Result Value Ref Range    HIV Antigen Antibody Combo Nonreactive NR^Nonreactive       T4 free    Collection Time: 07/16/18  8:17 AM   Result Value Ref Range    T4 Free 0.97 0.76 - 1.46 ng/dL   Neisseria gonorrhoea PCR    Collection Time: 07/16/18  7:40 PM   Result Value Ref Range    Specimen Descrip Urine     N Gonorrhea PCR Negative NEG^Negative   Chlamydia trachomatis PCR    Collection Time: 07/16/18  7:40 PM   Result Value Ref Range    Specimen Description Urine     Chlamydia Trachomatis PCR Negative NEG^Negative          Psychiatric Examination:     /84 (BP Location: Right arm)  Pulse 59  Temp 98.6  F (37  C) (Tympanic)  Resp 16  Ht 1.956 m (6' 5\")  Wt 84.4 kg (186 lb)  SpO2 99%  BMI 22.06 kg/m2  Weight is 186 lbs 0 oz  Body mass index is 22.06 kg/(m^2).    Orthostatic Vitals     None            Appearance: awake, alert, adequately groomed and dressed in hospital scrubs  Attitude:  cooperative  Eye Contact:  good  Mood:  anxious  Affect:  mood congruent  Speech:  clear, coherent and normal prosody  Language: fluent and intact in English  Psychomotor, Gait, Musculoskeletal:  no evidence of tardive dyskinesia, dystonia, or tics and intact station, gait and muscle tone  Throught Process:  logical, linear and goal oriented  Associations:  no loose associations  Thought Content:  no evidence of suicidal ideation or homicidal ideation and no evidence of psychotic thought  Insight:  fair  Judgement:  fair  Oriented to:  time, person, and place  Attention Span and Concentration:  intact  Recent and Remote Memory:  intact  Fund of Knowledge:  appropriate    Clinical Global Impressions  First:  Considering your total clinical experience with this particular patient population, how severe are the patient's symptoms at this time?: 7 (07/16/18 0732)  Compared to the patient's condition at the START of treatment, this patient's condition is:: 4 (07/16/18 0732)  Most " recent:  Considering your total clinical experience with this particular patient population, how severe are the patient's symptoms at this time?: 7 (07/16/18 0732)  Compared to the patient's condition at the START of treatment, this patient's condition is:: 4 (07/16/18 0732)    # Pain Assessment:   Colby s pain level was assessed and he currently denies pain.             Precautions:     Behavioral Orders   Procedures     Code 1 - Restrict to Unit     Routine Programming     As clinically indicated     Status 15     Every 15 minutes.     Withdrawal precautions          Diagnoses:      Opioid withdrawal (H)  Alcohol withdrawal syndrome without complication (H)  Opioid use disorder, severe, dependence (H)  Alcohol use disorder, moderate, dependence (H)         Assessment & Plan:     Target psychiatric symptoms and interventions:  Opioid dependence with withdrawal  -wrote for buprenorphine taper  - working on referral for CD treatment  Anxiety  -trial of clonidine 0.1 mg BID  ADHD  -to be addressed by provider after CD treatment. Patient may see some benefit from clonidine.    Medical Problems and Treatments:  none    Behavioral/Psychological/Social:  Encourage unit programming          Disposition Plan   Reason for ongoing admission: requires detoxification from substance that poses a risk of bodily harm during withdrawal period  Discharge location: Chemical dependency treatment facility  Discharge Medications: not ordered  Follow-up Appointments: not scheduled  Legal Status: voluntary  Entered by: Juan David Morocho on 7/17/2018 at 3:20 PM

## 2018-07-17 NOTE — PROGRESS NOTES
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 structure/stability does the person have in his or her daily life that will increase the likelihood that changes can be sustained? What problems exist in the person s environment that will jeopardize getting/staying clean and sober?)     Pt has no sober support.  Homeless.  Unemployed.  Current legal problems due to a bar fight with someone he knew. Court date pending.         Client Choice/Exceptions   Would you like services specific to language, age, gender, culture, Protestant preference, race, ethnicity, sexual orientation or disability?  No    What particular treatment choices and options would you like to have? Residential or IOP with Lodging    Do you have a preference for a particular treatment program? [facility] in [place] or someplace outside the [facility].    Criteria for Diagnosis     Criteria for Diagnosis  DSM-5 Criteria for Substance Use Disorder  Instructions: Determine whether the client currently meets the criteria for Substance Use Disorder using the diagnostic criteria in the DSM-V pp.[phone]. Current means during the most recent 12 months outside a facility that controls access to substances    Category of Substance Severity (ICD-10 Code / DSM 5 Code)     Alcohol Use Disorder Severe  (10.20) (303.90)   Cannabis  Use Disorder Severe   (F12.20) (304.30)   Hallucinogen Use Disorder NA   Inhalant Use Disorder NA   Opioid Use Disorder Severe   (F11.20) (304.00)   Sedative, Hypnotic, or Anxiolytic Use Disorder NA   Stimulant Related Disorder Severe   (F15.20) (304.40) Amphetamine type substance   Tobacco Use Disorder Moderate   (F17.200) (305.1)   Other (or unknown) Substance Use Disorder NA       Collateral Contact Summary   Number of contacts made: 1    Contact with referring person:  NA.    If court related records were reviewed, summarize here: NA    Information from collateral contacts supported/largely agreed with information from the client and associated risk ratings.      Rule 25 Assessment Summary and Plan   's Recommendation    Residential treatement or IOP with board and lodging  Follow the recommendations of your treatment program  Take medications as prescribed  Keep appointments with providers  Engage in mental health services in the community  Support group participation with sponsor        Collateral Contacts     Name:    [facility]    Relationship:       Phone Number:     Releases:         Juan David Morocho MD Physician Signed Psychiatry H&P   Date of Service: 7/16/2018  3:56 PM Creation Time: 7/16/2018  4:20 PM           Admitted:     07/15/2018       CHIEF COMPLAINT:  A 27-year-old man admitted for detoxification from IV heroin.       HISTORY OF PRESENT ILLNESS:  The patient is a 27-year-old man who presents for detoxification from heroin.  He has been using half gram to a gram per day.  He has also been drinking 4-5 tall cans of beer or at least a fifth of whiskey every day.  He states that he had been sober for over a year.  He relocated from [place] to [place] to live with one of his friends.  His friend ended up dying via overdose recently.  Since then, he relapsed, started using again.  He indicates that he did try to complete a Rule 25 and get in for treatment; however, with finding this  difficult, eventually someone suggested he come in for detox and seek assistance on referrals and he is here now.  The patient is very seeking of help.  He wants some assistance and hopes to get into [facility] for treatment.  The patient indicates that he was recently seen at [facility] for a bull's eye rash that was suspected to be Lyme's disease.  He was prescribed doxycycline, but did not take it because he could not afford the prescription.  In addition, he indicates that he believes he might have recently contracted hepatitis C.  He also reports having HPV and anemia.       PAST PSYCHIATRIC HISTORY:  The patient denies ever being in treatment before.  Indicates that he has been in detox multiple times in [place].  States that he came here for help because [place] does not have any treatment that is available if your are not able to pay.         PAST MEDICAL HISTORY:  Relevant past medical history is noted in the HPI.         SUBSTANCE HISTORY:  As noted in HPI.       PHYSICAL REVIEW OF SYSTEMS:  The patient reports feeling fatigued, depressed about his current situation, in addition to other problems noted in HPI.  Otherwise, denies problems on 10-point review of systems.       FAMILY HISTORY:  The patient reports that father had significant alcohol use issues.  Mother and brothers have problems with heroin use.       SOCIAL HISTORY:  Relevant social history is noted in HPI.       ALLERGIES:  NO KNOWN DRUG ALLERGIES.         PRIOR TO ADMISSION MEDICATIONS:  None.       LABORATORY DATA:  Comprehensive metabolic panel, low TSH of 0.23, free T4 is within normal limits.  Vitamin B12 is within normal limits.  CBC with differential within normal limits.  Lyme screen was 0.06, which does not confirm presence of antibodies.  Alcohol breath was negative.  Urine toxicology is positive for opiates and cannabinoids.       VITAL SIGNS:  Temperature 96.8, pulse 55, respiratory rate is 18, blood pressure is 121/84, oxygen  saturation 99% on room air.       PHYSICAL EXAMINATION:  I reviewed physical exam as documented by Internal Medicine consult by Sidra Johnson dated 07/16/2018.  I have no additional findings at this time.       MENTAL STATUS EXAMINATION:  The patient is in bed.  He is somewhat sleepy, but he is oriented to person, place and date.  He is cooperative throughout the interview.  He is unkempt, wearing hospital scrubs.  He has good eye contact.  Speech is normal in rate and volume.  Language is intact for word usage and sentence structure.  Mood is somewhat depressed.  Affect is congruent to mood.  Thought process is linear and goal oriented.  Associations are intact.  Thought content is currently negative for suicidal thoughts, homicidal thoughts or signs of psychosis.  He has no psychomotor agitation or retardation.  Muscle strength and tone and gait and station are within normal limits.  Recent and remote memory are intact.  Fund of knowledge is adequate.  Attention and concentration are intact.  Insight is fair, judgment is fair.       DIAGNOSES:   1.  Alcohol withdrawal.   2.  Opiate withdrawal.   3.  Alcohol use disorder, moderate.   4.  Opiate use disorder, severe.   5.  Cigarette nicotine dependence, currently withdrawal.   6.  Suspected early Lyme's disease.       PLAN:   1.  [facility] protocol for alcohol withdrawal using Valium to treat withdrawal symptoms.   2.  Buprenorphine 4 mg twice daily for opiate withdrawal.   3.  The patient to work with  on the unit for referral to chemical dependency treatment at [facility].   4.  Disposition:  The patient to discharge from hospital when bed available at treatment and out of detoxification.           DAKOTA COVARRUBIAS MD                       Collateral Contacts     Name:       Relationship:       Phone Number:       Releases:             ollateral Contacts      A problematic pattern of alcohol/drug use leading to clinically significant impairment or  distress, as manifested by at least two of the following, occurring within a 12-month period:    Alcohol/drug is often taken in larger amounts or over a longer period than was intended.  There is a persistent desire or unsuccessful efforts to cut down or control alcohol/drug use  A great deal of time is spent in activities necessary to obtain alcohol, use alcohol, or recover from its effects.  Craving, or a strong desire or urge to use alcohol/drug  Recurrent alcohol/drug use resulting in a failure to fulfill major role obligations at work, school or home.  Continued alcohol use despite having persistent or recurrent social or interpersonal problems caused or exacerbated by the effects of alcohol/drug.  Important social, occupational, or recreational activities are given up or reduced because of alcohol/drug use.  Recurrent alcohol/drug use in situations in which it is physically hazardous.  Alcohol/drug use is continued despite knowledge of having a persistent or recurrent physical or psychological problem that is likely to have been caused or exacerbated by alcohol.  Tolerance, as defined by either of the following: A need for markedly increased amounts of alcohol/drug to achieve intoxication or desired effect. and A markedly diminished effect with continued use of the same amount of alcohol/drug.  Withdrawal, as manifested by either of the following: The characteristic withdrawal syndrome for alcohol/drug (refer to Criteria A and B of the criteria set for alcohol/drug withdrawal). and Alcohol/drug (or a closely related substance, such as a benzodiazepine) is taken to relieve or avoid withdrawal symptoms.      Specify if: In early remission:  After full criteria for alcohol/drug use disorder were previously met, none of the criteria for alcohol/drug use disorder have been met for at least 3 months but for less than 12 months (with the exception that Criterion A4,  Craving or a strong desire or urge to use  alcohol/drug  may be met).     In sustained remission:   After full criteria for alcohol use disorder were previously met, non of the criteria for alcohol/drug use disorder have been met at any time during a period of 12 months or longer (with the exception that Criterion A4,  Craving or strong desire or urge to use alcohol/drug  may be met).   Specify if:   This additional specifier is used if the individual is in an environment where access to alcohol is restricted.    Mild: Presence of 2-3 symptoms    Moderate: Presence of 4-5 symptoms    Severe: Presence of 6 or more symptoms

## 2018-07-17 NOTE — PROGRESS NOTES
Brief Medicine Note    Medicine following for results of Gonorrhea and Chlamydia PCR both of which returned negative on 7/17/18.    Medicine will sign off. Please page the on-call GARCIA for any intercurrent medical issues which arise.    Kacey Espinoza PA-C  Hospitalist Service  [phone]

## 2018-07-18 PROCEDURE — 25000132 ZZH RX MED GY IP 250 OP 250 PS 637: Performed by: NURSE PRACTITIONER

## 2018-07-18 PROCEDURE — H0001 ALCOHOL AND/OR DRUG ASSESS: HCPCS

## 2018-07-18 PROCEDURE — 25000132 ZZH RX MED GY IP 250 OP 250 PS 637: Performed by: PSYCHIATRY & NEUROLOGY

## 2018-07-18 PROCEDURE — 99232 SBSQ HOSP IP/OBS MODERATE 35: CPT | Performed by: PSYCHIATRY & NEUROLOGY

## 2018-07-18 PROCEDURE — 25000132 ZZH RX MED GY IP 250 OP 250 PS 637: Performed by: PHYSICIAN ASSISTANT

## 2018-07-18 PROCEDURE — 12800012 ZZH R&B CD MH INTERMEDIATE ADULT

## 2018-07-18 RX ORDER — NICOTINE 21 MG/24HR
1 PATCH, TRANSDERMAL 24 HOURS TRANSDERMAL DAILY
Status: DISCONTINUED | OUTPATIENT
Start: 2018-07-18 | End: 2018-07-20 | Stop reason: HOSPADM

## 2018-07-18 RX ORDER — CLONIDINE HYDROCHLORIDE 0.1 MG/1
0.1 TABLET ORAL 3 TIMES DAILY
Status: DISCONTINUED | OUTPATIENT
Start: 2018-07-18 | End: 2018-07-20 | Stop reason: HOSPADM

## 2018-07-18 RX ADMIN — BUPRENORPHINE HCL 2 MG: 2 TABLET SUBLINGUAL at 21:16

## 2018-07-18 RX ADMIN — Medication 6 MG: at 22:14

## 2018-07-18 RX ADMIN — CLONIDINE HYDROCHLORIDE 0.1 MG: 0.1 TABLET ORAL at 08:22

## 2018-07-18 RX ADMIN — HYDROXYZINE HYDROCHLORIDE 25 MG: 25 TABLET, FILM COATED ORAL at 08:25

## 2018-07-18 RX ADMIN — Medication 100 MG: at 08:22

## 2018-07-18 RX ADMIN — HYDROXYZINE HYDROCHLORIDE 25 MG: 25 TABLET, FILM COATED ORAL at 17:57

## 2018-07-18 RX ADMIN — DOXYCYCLINE HYCLATE 100 MG: 100 CAPSULE ORAL at 20:41

## 2018-07-18 RX ADMIN — NICOTINE POLACRILEX 8 MG: 4 GUM, CHEWING ORAL at 12:38

## 2018-07-18 RX ADMIN — NICOTINE POLACRILEX 8 MG: 4 GUM, CHEWING ORAL at 08:25

## 2018-07-18 RX ADMIN — FOLIC ACID 1 MG: 1 TABLET ORAL at 08:22

## 2018-07-18 RX ADMIN — BUPRENORPHINE HCL 2 MG: 2 TABLET SUBLINGUAL at 08:20

## 2018-07-18 RX ADMIN — HYDROXYZINE HYDROCHLORIDE 25 MG: 25 TABLET, FILM COATED ORAL at 22:14

## 2018-07-18 RX ADMIN — BUPRENORPHINE HCL 2 MG: 2 TABLET SUBLINGUAL at 13:11

## 2018-07-18 RX ADMIN — NICOTINE POLACRILEX 8 MG: 4 GUM, CHEWING ORAL at 10:55

## 2018-07-18 RX ADMIN — MULTIPLE VITAMINS W/ MINERALS TAB 1 TABLET: TAB at 08:20

## 2018-07-18 RX ADMIN — CLONIDINE HYDROCHLORIDE 0.1 MG: 0.1 TABLET ORAL at 20:41

## 2018-07-18 RX ADMIN — NICOTINE 1 PATCH: 14 PATCH, EXTENDED RELEASE TRANSDERMAL at 13:28

## 2018-07-18 RX ADMIN — NICOTINE POLACRILEX 8 MG: 4 GUM, CHEWING ORAL at 18:34

## 2018-07-18 RX ADMIN — CLONIDINE HYDROCHLORIDE 0.1 MG: 0.1 TABLET ORAL at 13:11

## 2018-07-18 RX ADMIN — TRAZODONE HYDROCHLORIDE 50 MG: 50 TABLET ORAL at 22:14

## 2018-07-18 RX ADMIN — ACETAMINOPHEN 650 MG: 325 TABLET, FILM COATED ORAL at 08:25

## 2018-07-18 RX ADMIN — DOXYCYCLINE HYCLATE 100 MG: 100 CAPSULE ORAL at 08:20

## 2018-07-18 ASSESSMENT — ACTIVITIES OF DAILY LIVING (ADL)
DRESS: INDEPENDENT
GROOMING: INDEPENDENT
ORAL_HYGIENE: INDEPENDENT
DRESS: INDEPENDENT
GROOMING: INDEPENDENT
ORAL_HYGIENE: INDEPENDENT
LAUNDRY: WITH SUPERVISION

## 2018-07-18 NOTE — PROGRESS NOTES
"[facility], [place]   Psychiatric Progress Note  Hospital Day: 3        Interim History:   The patient's care was discussed with the treatment team during the daily team meeting and/or staff's chart notes were reviewed.  Staff reports that the patient was asking for increase in clonidine. [facility] indicates funding approved for [facility]. He will need to detox fully before going there.    Upon interview, the patient reports minimal response to second dose of clonidine when he also took the hydroxyzine. It was helpful for about 45 minutes. He'd like an afternoon dose.    Psychiatric Symptoms: anxiety, distractibility     Medication side effects reported: denies    Other issues reported by patient: none         Medications:       buprenorphine  2 mg Sublingual TID     [START ON 7/19/2018] buprenorphine  2 mg Sublingual BID     [START ON 7/20/2018] buprenorphine  2 mg Sublingual Daily     cloNIDine  0.1 mg Oral TID     doxycycline  100 mg Oral Q12H WANDA     folic acid  1 mg Oral Daily     multivitamin, therapeutic with minerals  1 tablet Oral Daily          Allergies:   No Known Allergies       Labs:     Recent Results (from the past 48 hour(s))   Neisseria gonorrhoea PCR    Collection Time: 07/16/18  7:40 PM   Result Value Ref Range    Specimen Descrip Urine     N Gonorrhea PCR Negative NEG^Negative   Chlamydia trachomatis PCR    Collection Time: 07/16/18  7:40 PM   Result Value Ref Range    Specimen Description Urine     Chlamydia Trachomatis PCR Negative NEG^Negative          Psychiatric Examination:     /81 (BP Location: Left arm)  Pulse 68  Temp 98.2  F (36.8  C) (Tympanic)  Resp 16  Ht 1.956 m (6' 5\")  Wt 84.4 kg (186 lb)  SpO2 99%  BMI 22.06 kg/m2  Weight is 186 lbs 0 oz  Body mass index is 22.06 kg/(m^2).    Orthostatic Vitals     None            Appearance: awake, alert, adequately groomed and dressed in hospital scrubs  Attitude:  cooperative  Eye Contact:  good  Mood:  " better  Affect:  mood congruent  Speech:  clear, coherent and normal prosody  Language: fluent and intact in English  Psychomotor, Gait, Musculoskeletal:  no evidence of tardive dyskinesia, dystonia, or tics and intact station, gait and muscle tone  Throught Process:  logical, linear and goal oriented  Associations:  no loose associations  Thought Content:  no evidence of suicidal ideation or homicidal ideation and no evidence of psychotic thought  Insight:  fair  Judgement:  fair  Oriented to:  time, person, and place  Attention Span and Concentration:  intact  Recent and Remote Memory:  intact  Fund of Knowledge:  appropriate    Clinical Global Impressions  First:  Considering your total clinical experience with this particular patient population, how severe are the patient's symptoms at this time?: 7 (07/16/18 0732)  Compared to the patient's condition at the START of treatment, this patient's condition is:: 4 (07/16/18 0732)  Most recent:  Considering your total clinical experience with this particular patient population, how severe are the patient's symptoms at this time?: 7 (07/16/18 0732)  Compared to the patient's condition at the START of treatment, this patient's condition is:: 4 (07/16/18 0732)    # Pain Assessment:   Colby s pain level was assessed and he currently denies pain.             Precautions:     Behavioral Orders   Procedures     Code 1 - Restrict to Unit     Routine Programming     As clinically indicated     Status 15     Every 15 minutes.     Withdrawal precautions          Diagnoses:      Opioid withdrawal (H)  Alcohol withdrawal syndrome without complication (H)  Opioid use disorder, severe, dependence (H)  Alcohol use disorder, moderate, dependence (H)         Assessment & Plan:     Target psychiatric symptoms and interventions:  Opioid dependence with withdrawal  -Buprenorphine taper fully written. Will need to complete before he can go to treatment.  Anxiety  -trial of clonidine.  Increase to 0.1 mg TID  ADHD  -to be addressed by provider after CD treatment. Patient may see some benefit from clonidine.    Medical Problems and Treatments:  none    Behavioral/Psychological/Social:  Encourage unit programming      Disposition Plan   Reason for ongoing admission: requires detoxification from substance that poses a risk of bodily harm during withdrawal period  Discharge location: Chemical dependency treatment facility  Discharge Medications: not ordered  Follow-up Appointments: not scheduled  Legal Status: voluntary  Entered by: Juan David Morocho on 7/17/2018 at 11:47 AM

## 2018-07-18 NOTE — PROGRESS NOTES
Pt funding for [employer] authorized by Jeanie at [facility].   Call placed to [facility] to inform and coordinate admission.  Left .  Pt will need to complete his Suboxone taper prior to discharge.

## 2018-07-19 PROCEDURE — 25000132 ZZH RX MED GY IP 250 OP 250 PS 637: Performed by: NURSE PRACTITIONER

## 2018-07-19 PROCEDURE — 25000132 ZZH RX MED GY IP 250 OP 250 PS 637: Performed by: PSYCHIATRY & NEUROLOGY

## 2018-07-19 PROCEDURE — 25000132 ZZH RX MED GY IP 250 OP 250 PS 637: Performed by: PHYSICIAN ASSISTANT

## 2018-07-19 PROCEDURE — 99238 HOSP IP/OBS DSCHRG MGMT 30/<: CPT | Performed by: PSYCHIATRY & NEUROLOGY

## 2018-07-19 PROCEDURE — 12800012 ZZH R&B CD MH INTERMEDIATE ADULT

## 2018-07-19 RX ORDER — HYDROXYZINE HYDROCHLORIDE 25 MG/1
25 TABLET, FILM COATED ORAL EVERY 4 HOURS PRN
Qty: 30 TABLET | Refills: 0 | Status: SHIPPED | OUTPATIENT
Start: 2018-07-19 | End: 2018-08-18

## 2018-07-19 RX ORDER — MULTIPLE VITAMINS W/ MINERALS TAB 9MG-400MCG
1 TAB ORAL DAILY
Qty: 30 EACH | Refills: 0 | Status: SHIPPED | OUTPATIENT
Start: 2018-07-20

## 2018-07-19 RX ORDER — CLONIDINE HYDROCHLORIDE 0.1 MG/1
0.1 TABLET ORAL 2 TIMES DAILY PRN
Qty: 10 TABLET | Refills: 0 | Status: SHIPPED | OUTPATIENT
Start: 2018-07-19

## 2018-07-19 RX ORDER — GABAPENTIN 100 MG/1
100 CAPSULE ORAL 3 TIMES DAILY
Status: DISCONTINUED | OUTPATIENT
Start: 2018-07-19 | End: 2018-07-20 | Stop reason: HOSPADM

## 2018-07-19 RX ORDER — FOLIC ACID 1 MG/1
1 TABLET ORAL DAILY
Qty: 30 TABLET | Refills: 0 | Status: SHIPPED | OUTPATIENT
Start: 2018-07-20 | End: 2018-08-19

## 2018-07-19 RX ORDER — TRAZODONE HYDROCHLORIDE 50 MG/1
50 TABLET, FILM COATED ORAL
Qty: 30 TABLET | Refills: 0 | Status: SHIPPED | OUTPATIENT
Start: 2018-07-19 | End: 2018-08-18

## 2018-07-19 RX ORDER — LANOLIN ALCOHOL/MO/W.PET/CERES
6 CREAM (GRAM) TOPICAL
Qty: 30 TABLET | Refills: 0 | Status: SHIPPED | OUTPATIENT
Start: 2018-07-19 | End: 2018-08-18

## 2018-07-19 RX ORDER — GABAPENTIN 100 MG/1
100 CAPSULE ORAL 3 TIMES DAILY
Qty: 90 CAPSULE | Refills: 0 | Status: SHIPPED | OUTPATIENT
Start: 2018-07-19 | End: 2018-08-18

## 2018-07-19 RX ORDER — DOXYCYCLINE 100 MG/1
100 CAPSULE ORAL EVERY 12 HOURS
Qty: 60 CAPSULE | Refills: 1 | Status: SHIPPED | OUTPATIENT
Start: 2018-07-19 | End: 2018-08-21

## 2018-07-19 RX ORDER — CLONIDINE HYDROCHLORIDE 0.1 MG/1
0.1 TABLET ORAL 3 TIMES DAILY
Qty: 90 TABLET | Refills: 0 | Status: SHIPPED | OUTPATIENT
Start: 2018-07-19 | End: 2018-08-18

## 2018-07-19 RX ADMIN — GABAPENTIN 100 MG: 100 CAPSULE ORAL at 20:25

## 2018-07-19 RX ADMIN — NICOTINE 1 PATCH: 14 PATCH, EXTENDED RELEASE TRANSDERMAL at 09:01

## 2018-07-19 RX ADMIN — NICOTINE POLACRILEX 8 MG: 4 GUM, CHEWING ORAL at 13:03

## 2018-07-19 RX ADMIN — DOXYCYCLINE HYCLATE 100 MG: 100 CAPSULE ORAL at 20:25

## 2018-07-19 RX ADMIN — HYDROXYZINE HYDROCHLORIDE 25 MG: 25 TABLET, FILM COATED ORAL at 11:45

## 2018-07-19 RX ADMIN — TRAZODONE HYDROCHLORIDE 50 MG: 50 TABLET ORAL at 22:00

## 2018-07-19 RX ADMIN — DOXYCYCLINE HYCLATE 100 MG: 100 CAPSULE ORAL at 09:01

## 2018-07-19 RX ADMIN — CLONIDINE HYDROCHLORIDE 0.1 MG: 0.1 TABLET ORAL at 09:01

## 2018-07-19 RX ADMIN — ACETAMINOPHEN 650 MG: 325 TABLET, FILM COATED ORAL at 11:45

## 2018-07-19 RX ADMIN — GABAPENTIN 100 MG: 100 CAPSULE ORAL at 16:34

## 2018-07-19 RX ADMIN — BUPRENORPHINE HCL 2 MG: 2 TABLET SUBLINGUAL at 20:25

## 2018-07-19 RX ADMIN — NICOTINE POLACRILEX 8 MG: 4 GUM, CHEWING ORAL at 22:00

## 2018-07-19 RX ADMIN — CLONIDINE HYDROCHLORIDE 0.1 MG: 0.1 TABLET ORAL at 20:25

## 2018-07-19 RX ADMIN — HYDROXYZINE HYDROCHLORIDE 25 MG: 25 TABLET, FILM COATED ORAL at 16:33

## 2018-07-19 RX ADMIN — FOLIC ACID 1 MG: 1 TABLET ORAL at 09:01

## 2018-07-19 RX ADMIN — NICOTINE POLACRILEX 8 MG: 4 GUM, CHEWING ORAL at 18:08

## 2018-07-19 RX ADMIN — MULTIPLE VITAMINS W/ MINERALS TAB 1 TABLET: TAB at 09:01

## 2018-07-19 RX ADMIN — Medication 6 MG: at 22:40

## 2018-07-19 RX ADMIN — TRAZODONE HYDROCHLORIDE 50 MG: 50 TABLET ORAL at 22:40

## 2018-07-19 RX ADMIN — NICOTINE POLACRILEX 8 MG: 4 GUM, CHEWING ORAL at 11:04

## 2018-07-19 RX ADMIN — CLONIDINE HYDROCHLORIDE 0.1 MG: 0.1 TABLET ORAL at 13:07

## 2018-07-19 RX ADMIN — BUPRENORPHINE HCL 2 MG: 2 TABLET SUBLINGUAL at 09:01

## 2018-07-19 ASSESSMENT — ACTIVITIES OF DAILY LIVING (ADL)
LAUNDRY: WITH SUPERVISION
DRESS: INDEPENDENT
ORAL_HYGIENE: INDEPENDENT
ORAL_HYGIENE: INDEPENDENT
DRESS: INDEPENDENT
GROOMING: INDEPENDENT
LAUNDRY: WITH SUPERVISION
GROOMING: INDEPENDENT

## 2018-07-19 NOTE — PLAN OF CARE
Problem: Substance Withdrawal  Goal: Substance Withdrawal  Signs and symptoms of listed problems will be absent or manageable.   Outcome: Improving  Pt denies SI/SIB/HI.  Pt active and social in the milieu at times.  COWS scores of 4 and 3.  Pt c/o increase in depression and anxiety when sober and requested to be started on an anti depressant, no new orders at this time.  No other issues at this time.  Will continue to monitor.

## 2018-07-19 NOTE — DISCHARGE SUMMARY
Psychiatric Discharge Summary    Colby Jaimes MRN# [id number]   Age: 27 year old YOB: 1990     Date of Admission:  7/15/2018  Date of Discharge:  Tomorrow 7/20/2018  Admitting Physician:  Juan David Morocho MD  Discharge Physician:  Azam Smiley MD         Event Leading to Hospitalization:     The patient is a 27-year-old man who presents for detoxification from heroin.  He has been using half gram to a gram per day.  He has also been drinking 4-5 tall cans of beer or at least a fifth of whiskey every day.  He states that he had been sober for over a year.  He relocated from [place] to [place] to live with one of his friends.  His friend ended up dying via overdose recently.  Since then, he relapsed, started using again.  He indicates that he did try to complete a Rule 25 and get in for treatment; however, with finding this difficult, eventually someone suggested he come in for detox and seek assistance on referrals and he is here now.  The patient is very seeking of help.  He wants some assistance and hopes to get into [facility] for treatment.  The patient indicates that he was recently seen at [facility] for a bull's eye rash that was suspected to be Lyme's disease.  He was prescribed doxycycline, but did not take it because he could not afford the prescription.  In addition, he indicates that he believes he might have recently contracted hepatitis C.  He also reports having HPV and anemia. The patient denies ever being in treatment before.  Indicates that he has been in detox multiple times in [place].  States that he came here for help because [place] does not have any treatment that is available if your are not able to pay.   The patient reports that father had significant alcohol use issues.  Mother and brothers have problems with heroin use.        See Admission note by Juan David Morocho MD on 7/16/2018 for additional details.          Diagnoses:     1.  Alcohol withdrawal.   2.   Opiate withdrawal.   3.  Alcohol use disorder, moderate.   4.  Opiate use disorder, severe.   5.  Cigarette nicotine dependence, currently withdrawal.   6.  Suspected early Lyme's disease.          Labs:     Recent Results (from the past 168 hour(s))   Drug abuse screen 6 urine (tox)    Collection Time: 07/15/18  2:38 PM   Result Value Ref Range    Amphetamine Qual Urine Negative NEG^Negative    Barbiturates Qual Urine Negative NEG^Negative    Benzodiazepine Qual Urine Negative NEG^Negative    Cannabinoids Qual Urine Positive (A) NEG^Negative    Cocaine Qual Urine Negative NEG^Negative    Ethanol Qual Urine Negative NEG^Negative    Opiates Qualitative Urine Positive (A) NEG^Negative   Alcohol breath test POCT    Collection Time: 07/15/18  2:44 PM   Result Value Ref Range    Alcohol Breath Test 0.00 0.00 - 0.01   Lyme Disease Summer with reflex to WB Serum    Collection Time: 07/15/18  3:20 PM   Result Value Ref Range    Lyme Disease Antibodies Serum 0.06 0.00 - 0.89   CBC with platelets differential    Collection Time: 07/16/18  8:17 AM   Result Value Ref Range    WBC 5.4 4.0 - 11.0 10e9/L    RBC Count 4.72 4.4 - 5.9 10e12/L    Hemoglobin 14.2 13.3 - 17.7 g/dL    Hematocrit 42.9 40.0 - 53.0 %    MCV 91 78 - 100 fl    MCH 30.1 26.5 - 33.0 pg    MCHC 33.1 31.5 - 36.5 g/dL    RDW 12.4 10.0 - 15.0 %    Platelet Count 252 150 - 450 10e9/L    Diff Method Automated Method     % Neutrophils 32.8 %    % Lymphocytes 49.9 %    % Monocytes 10.1 %    % Eosinophils 6.3 %    % Basophils 0.7 %    % Immature Granulocytes 0.2 %    Nucleated RBCs 0 0 /100    Absolute Neutrophil 1.8 1.6 - 8.3 10e9/L    Absolute Lymphocytes 2.7 0.8 - 5.3 10e9/L    Absolute Monocytes 0.5 0.0 - 1.3 10e9/L    Absolute Eosinophils 0.3 0.0 - 0.7 10e9/L    Absolute Basophils 0.0 0.0 - 0.2 10e9/L    Abs Immature Granulocytes 0.0 0 - 0.4 10e9/L    Absolute Nucleated RBC 0.0    GGT    Collection Time: 07/16/18  8:17 AM   Result Value Ref Range    GGT 17 0 - 75 U/L    Comprehensive metabolic panel    Collection Time: 07/16/18  8:17 AM   Result Value Ref Range    Sodium 143 133 - 144 mmol/L    Potassium 4.3 3.4 - 5.3 mmol/L    Chloride 109 94 - 109 mmol/L    Carbon Dioxide 29 20 - 32 mmol/L    Anion Gap 5 3 - 14 mmol/L    Glucose 83 70 - 99 mg/dL    Urea Nitrogen 10 7 - 30 mg/dL    Creatinine 0.89 0.66 - 1.25 mg/dL    GFR Estimate >90 >60 mL/min/1.7m2    GFR Estimate If Black >90 >60 mL/min/1.7m2    Calcium 8.9 8.5 - 10.1 mg/dL    Bilirubin Total 0.6 0.2 - 1.3 mg/dL    Albumin 3.6 3.4 - 5.0 g/dL    Protein Total 7.1 6.8 - 8.8 g/dL    Alkaline Phosphatase 71 40 - 150 U/L    ALT 19 0 - 70 U/L    AST 15 0 - 45 U/L   TSH with free T4 reflex and/or T3 as indicated    Collection Time: 07/16/18  8:17 AM   Result Value Ref Range    TSH 0.23 (L) 0.40 - 4.00 mU/L   Folate    Collection Time: 07/16/18  8:17 AM   Result Value Ref Range    Folate 19.0 >5.4 ng/mL   Vitamin B12    Collection Time: 07/16/18  8:17 AM   Result Value Ref Range    Vitamin B12 465 193 - 986 pg/mL   Hepatitis C Screen Reflex to HCV RNA Quant and Genotype    Collection Time: 07/16/18  8:17 AM   Result Value Ref Range    Hepatitis C Antibody Nonreactive NR^Nonreactive   HIV Antigen Antibody Combo    Collection Time: 07/16/18  8:17 AM   Result Value Ref Range    HIV Antigen Antibody Combo Nonreactive NR^Nonreactive       T4 free    Collection Time: 07/16/18  8:17 AM   Result Value Ref Range    T4 Free 0.97 0.76 - 1.46 ng/dL   Neisseria gonorrhoea PCR    Collection Time: 07/16/18  7:40 PM   Result Value Ref Range    Specimen Descrip Urine     N Gonorrhea PCR Negative NEG^Negative   Chlamydia trachomatis PCR    Collection Time: 07/16/18  7:40 PM   Result Value Ref Range    Specimen Description Urine     Chlamydia Trachomatis PCR Negative NEG^Negative          Consults:   IV heroin, alcohol abuse, depression, anxiety: Was sober for a period of time and relapsed about 2 months ago  - Management per psych     Lyme disease?:  Per patient diagnosed at outside facility a few months ago but did not follow up with antibiotics. Started on doxy per ED. Was not started per primary team  - Resume 21 day doxycycline course     STI testing: requested per patient  - Gonorrhea, chlamydia ordered. Medicine will follow      Subclinical hyperthyroidism: TSH 0.23, but T4 normal  - Suggest follow up OP TSH/T4 in 6 weeks  Medicine will peripherally follow STI testing. Please contact if future questions or concerns arise. Thank you for the opportunity to be a part of this patient's care.    Sidra Johnson  Internal Medicine [facility] Course:   Colby Jaimes was admitted to Detox  Unit with attending Juan David Morocho MD who formulated the care plan. His care was transferred to Azam mSiley MD prior to discharge. He was admitted as a voluntary patient. The patient was placed under status 15 (15 minute checks) to ensure patient safety.  Patient  did not require seclusion or administration of emergency medications to manage behavior. The patient was placed on opiate withdrawal protocol with Subutex taper. He was tapered off Subutex prior to discharge. She completed alcohol detox via [facility] PRN uneventfully. The patient completed CD assessment and sought referral to [facility]. Gabapentin was started to address reported anxiety (may consider increasing the dose to 300 mg TID if clinically indicated in future), clonidine for restlessness and break through withdrawal, vistaril PRN for anxiety and trazodone and Melatonin for insomnia.  The patient tolerated medications well. He was somatic and exhibited medications seeking behavior.  Reported mood symptoms subsided.  The patient was social, engaged and attended groups. No overt arvind, confusion or psychosis noted. The patient maintained denial of SI, HI and BRAYDEN. Sleep and appetite improved. The patient was compliant with medications and care.     Colby Jaimes will be  released to [facility] tomorrow. At the time of this encounter, Colby Jaimes was determined to not be a danger to himself or others and symptoms did not meet criteria for involuntary hospitalization.  The patient denied depression, racing thoughts and irritability. He noted feeling anxious and worried about perceived rapid Subutex taper. Rumination fluctuates. The patient denied hallucinations and paranoia. The patient was future oriented and denied SI, BRAYDEN and HI. The patient noted tolerating medications well.    Steps taken to minimize risk include: assessing patient s behavior and thought process daily during hospital stay, discharging patient with adequate plan for follow up for mental and physical health, and discussing safety plan of returning to the hospital or calling 911, should the patient ever has thoughts of harming self or others. Therefore, based on all available evidence including the factors cited above, the patient does not appear to be at imminent risk for self-harm, and is appropriate for outpatient level of care.  The patient agreed to continue medications and outpatient care.     ADDENDUM: 7/20/2018  The patient demanded discharge on 7/20 when informed that bed will open at [facility] on Monday. He was irritable and hostile toward RN. He was granted AMA discharge as symptoms did not meet criteria for involuntary hospitalization.  No medications issued on discharge.          Discharge Medications:        Review of your medicines      START taking       Dose / Directions    * cloNIDine 0.1 MG tablet   Commonly known as:  CATAPRES   Used for:  Depression with anxiety        Dose:  0.1 mg   Take 1 tablet (0.1 mg) by mouth 3 times daily   Quantity:  90 tablet   Refills:  0       * cloNIDine 0.1 MG tablet   Commonly known as:  CATAPRES        Dose:  0.1 mg   Take 1 tablet (0.1 mg) by mouth 2 times daily as needed   Quantity:  10 tablet   Refills:  0       * doxycycline 100 MG capsule   Commonly  known as:  VIBRAMYCIN        Dose:  100 mg   Take 1 capsule (100 mg) by mouth 2 times daily for 18 days   Quantity:  36 capsule   Refills:  0       * doxycycline 100 MG capsule   Commonly known as:  VIBRAMYCIN   Indication:  suspected Lyme   Used for:  Lyme disease        Dose:  100 mg   Take 1 capsule (100 mg) by mouth every 12 hours   Quantity:  60 capsule   Refills:  1       folic acid 1 MG tablet   Commonly known as:  FOLVITE        Dose:  1 mg   Start taking on:  7/20/2018   Take 1 tablet (1 mg) by mouth daily   Quantity:  30 tablet   Refills:  0       gabapentin 100 MG capsule   Commonly known as:  NEURONTIN   Used for:  Depression with anxiety        Dose:  100 mg   Take 1 capsule (100 mg) by mouth 3 times daily   Quantity:  90 capsule   Refills:  0       hydrOXYzine 25 MG tablet   Commonly known as:  ATARAX   Used for:  Depression with anxiety        Dose:  25 mg   Take 1 tablet (25 mg) by mouth every 4 hours as needed for anxiety   Quantity:  30 tablet   Refills:  0       melatonin 3 MG tablet   Used for:  Psychophysiological insomnia        Dose:  6 mg   Take 2 tablets (6 mg) by mouth nightly as needed for sleep   Quantity:  30 tablet   Refills:  0       multivitamin, therapeutic with minerals Tabs tablet        Dose:  1 tablet   Start taking on:  7/20/2018   Take 1 tablet by mouth daily   Quantity:  30 each   Refills:  0       traZODone 50 MG tablet   Commonly known as:  DESYREL   Used for:  Depression with anxiety        Dose:  50 mg   Take 1 tablet (50 mg) by mouth nightly as needed for sleep   Quantity:  30 tablet   Refills:  0       * Notice:  This list has 4 medication(s) that are the same as other medications prescribed for you. Read the directions carefully, and ask your doctor or other care provider to review them with you.         Where to get your medicines      These medications were sent to [facility] [place], [place] - [address]  [address]  [address]     Phone:  [phone]      cloNIDine 0.1 MG tablet     cloNIDine 0.1 MG tablet     doxycycline 100 MG capsule     folic acid 1 MG tablet     gabapentin 100 MG capsule     hydrOXYzine 25 MG tablet     melatonin 3 MG tablet     multivitamin, therapeutic with minerals Tabs tablet     traZODone 50 MG tablet         Some of these will need a paper prescription and others can be bought over the counter. Ask your nurse if you have questions.     Bring a paper prescription for each of these medications      doxycycline 100 MG capsule                Psychiatric and Physical Examinations:   Appearance:  awake, alert, adequately groomed, appeared as age stated and no apparent distress  Attitude:  cooperative  Eye Contact:  good  Mood:  anxious  Affect:  appropriate and in normal range, full range and reactive  Speech:  clear, coherent and normal prosody  Psychomotor Behavior:  no evidence of tardive dyskinesia, dystonia, or tics and intact station, gait and muscle tone  Thought Process:  linear and goal oriented  Associations:  no loose associations  Thought Content:  no evidence of suicidal ideation or homicidal ideation and no evidence of psychotic thought  Insight:  fair  Judgment:  intact  Oriented to:  time, person, and place  Attention Span and Concentration:  intact  Recent and Remote Memory:  intact  Language and Fund of Knowledge: appropriate  Muscle Strength and Tone: normal  Gait and Station: Normal  Vitals:    07/18/18 2032 07/19/18 0832 07/19/18 1150 07/19/18 1306   BP: 122/83 101/74 94/70 100/69   BP Location: Left arm Left arm Left arm    Pulse: 65 52 62 74   Resp: 16 16 16    Temp: 98  F (36.7  C) 96.9  F (36.1  C) 95.9  F (35.5  C)    TempSrc: Tympanic Oral Tympanic    SpO2:       Weight:       Height:              Discharge Plan:     Disposition: [facility] program.   Resources:  [facility] [phone] Support Group:  AA/JEAN CARLOS and Sponsor/support.  Crisis Intervention: 624.327.1471  or 950-817-2018 (TTY: 657.691.5568). Call anytime for help.  [place] on Mental Illness (www.[place].malka.org): 590.405.5065 or 302-029-6862.  Alcoholics Anonymous (www.alcoholics-anonymous.org): Check your phone book for your local chapter.  Suicide Awareness Voices of Education (www.save.org): 890-139-MVVH (3171)  National Suicide Prevention Line (www.mentalhealthmn.org): 829-485-RAUQ (4025)  Mental Health Consumer/Survivor Network of [place] (www.mhcsn.net): 468.546.8190 or 805-610-3613.  Mental Health Association of [place] (www.mentalhealth.org): 860.871.1771 or 959-292-7025  Substance Abuse and Mental Health Services. (www.samhsa.gov)     [place] Recovery Connection ([facility])  [facility] connects people seeking recovery to resources that help foster and sustain long-term recovery.  Whether you are seeking resources for treatment, transportation, housing, job training, education, health care or other pathways to recovery, [facility] is a great place to start. 792.822.9192 www.[facility].org    Attestation:  The patient has been seen and evaluated by me,  Azam Smiley MD
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
## 2018-07-19 NOTE — PROGRESS NOTES
The patient has been active in the milieu this shift.  He exhibits a range of affect.  He denies any thoughts of self harm, but admits to being very anxious.  He is concerned that his Suboxone taper is too fast, but understands that he  Needs to do this to go to treatment.  The plan is for the patient to go to [facility] tomorrow.  He is concerned that he will be discharged without a bed.  The patient was informed that the order was for discharge if a bed was available.

## 2018-07-20 VITALS
TEMPERATURE: 96.1 F | DIASTOLIC BLOOD PRESSURE: 61 MMHG | HEART RATE: 62 BPM | HEIGHT: 77 IN | WEIGHT: 186 LBS | BODY MASS INDEX: 21.96 KG/M2 | SYSTOLIC BLOOD PRESSURE: 101 MMHG | OXYGEN SATURATION: 99 % | RESPIRATION RATE: 16 BRPM

## 2018-07-20 PROCEDURE — 25000132 ZZH RX MED GY IP 250 OP 250 PS 637: Performed by: PSYCHIATRY & NEUROLOGY

## 2018-07-20 PROCEDURE — 25000132 ZZH RX MED GY IP 250 OP 250 PS 637: Performed by: PHYSICIAN ASSISTANT

## 2018-07-20 RX ADMIN — CLONIDINE HYDROCHLORIDE 0.1 MG: 0.1 TABLET ORAL at 08:26

## 2018-07-20 RX ADMIN — FOLIC ACID 1 MG: 1 TABLET ORAL at 08:26

## 2018-07-20 RX ADMIN — DOXYCYCLINE HYCLATE 100 MG: 100 CAPSULE ORAL at 08:26

## 2018-07-20 RX ADMIN — GABAPENTIN 100 MG: 100 CAPSULE ORAL at 08:26

## 2018-07-20 RX ADMIN — MULTIPLE VITAMINS W/ MINERALS TAB 1 TABLET: TAB at 08:26

## 2018-07-20 RX ADMIN — NICOTINE 1 PATCH: 14 PATCH, EXTENDED RELEASE TRANSDERMAL at 08:27

## 2018-07-20 ASSESSMENT — ACTIVITIES OF DAILY LIVING (ADL)
GROOMING: HANDWASHING;INDEPENDENT
DRESS: INDEPENDENT
ORAL_HYGIENE: INDEPENDENT
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
## 2018-07-20 NOTE — DISCHARGE INSTRUCTIONS
Behavioral [facility] Discharge Planning and Instructions  THANK YOU FOR CHOOSING THE [address]: [phone]    Summary: You were admitted to  on July 15, 2018 for detoxification from opioids.  A medical examination was performed that included lab work. You have met with a  and opted to enter treatment at [facility] in [place]. Your admission is scheduled for 7/23/18.  You are choosing to discharge against medical advise. Your funding has been approved by [facility].  Please make your recovery a priority, Mr. Jaimes.    [facility]  [address]   [place] [address]   [phone]     Main Diagnosis:  Opioids Dependence    Major Treatments, Procedures and Findings:  You were detoxified from opioids using the Buprenorhine protocol. You have had a chemical dependency assessment.  You have had blood drawn, and the results have been reviewed with you.  Please take a copy of your laboratory work with you to your next provider appointment.    Symptoms to Report:  If you experience more anxiety, confusion, sleeplessness, deep sadness or thoughts of suicide, notify your treatment team or notify your primary care physician. IF THE SYMPTOMS YOU ARE EXPERIENCING ARE A MEDICAL EMERGENCY, CALL 911 IMMEDIATELY.     Lifestyle Adjustment: Adjust your lifestyle to get enough sleep, relaxation, exercise and excellent nutrition. Continue to develop healthy coping skills to decrease stress and promote a sober living environment. Do not use alcohol, illegal drugs or addictive medications other than what is currently prescribed. AA, NA, and a sponsor are excellent resources for support.     Primary Provider:  The patient will follow up with the psychiatrist at [facility].      Resources:  [facility] 119-369-4468 Support Group:  AA/NA and Sponsor/support.  Crisis Intervention: 752.666.7690 or 395-428-1257 (TTY: 195.722.1824). Call anytime for  help.  National [place] on Mental Illness (www.[place].malka.org): 539.287.3786 or 256-745-1467.  Alcoholics Anonymous (www.alcoholics-anonymous.org): Check your phone book for your local chapter.  Suicide Awareness Voices of Education (www.save.org): 676-536-TTTO (1027)  National Suicide Prevention Line (www.mentalhealthmn.org): 753-003-OCFN (6378)  Mental Health Consumer/Survivor Network of [place] (www.mhcsn.net): 371.170.9711 or 538-389-3302.  Mental Health Association of [place] (www.mentalhealth.org): 455.809.9574 or 852-250-0046  Substance Abuse and Mental Health Services. (www.samhsa.gov)    DISCHARGE RESOURCES:  -SMART Recovery - self management for addiction recovery:  www.smartrecovery.org    -Pathways ~ A Health Crisis Resource & Support Center: 322.272.6460.  -[facility] [address] 699-908-7583   -[facility] Behavioral Intake 836-170-9336 or 391-439-6303.  -Crisis Intervention: 612.681.7304 or 651-087-3788 (TTY: 377.609.7787).  Call anytime.  -Suicide Awareness Voices of Education (SAVE) (www.save.org): 155-387-TMMV (6901)  -National Suicide Prevention Line (www.mentalhealthmn.org): 968-068-YGUC (8587)  -National [place] on Mental Illness (www.mn.malka.org): 584.443.8072 or 302-478-6566.  -Xwti0xcjv: text the word LIFE to 34632 for immediate support and crisis intervention  -Mental Health Consumer/Survivor Network of [place] (www.mhcsn.net): 664.998.3399 or 458-662-2585  -Mental Health Association of [place] (www.mentalhealth.org): 293.380.5204 or 979-548-7336     -Substance Abuse and Mental Health Services (www.samhsa.gov)  -Harm Reduction Coalition (www. Harmreduction.org)  -www.prescribetoprevent.org or http://prescribetoprevent.org/video  -Poison control 1-102-271-3434   **[place] Opioid Prevention Coalition: www.opioidcoalition.org    Sober Support Group Information:  ELISABET/JEAN CALROS & Sponsor/Support  -Alcoholics Anonymous (www.alcoholics-anonymous.org): for local information 24 hours/day  -ELISABET  Intergroup service office in [place] (http://www.aastpaul.org/) 629.642.6234  -AA Intergroup service office in [facility]: 886.376.5425. (http://www.a[place].org/)  -Narcotics Anonymous (www.nam[place].org) (230) 783-2376   **Sober Fun Activities: www.soberGlobe Icons InteractiveactivitiesTicketForEvent/[facility]//[facility] [place] Recovery [facility] ([facility])  [facility] connects people seeking recovery to resources that help foster and sustain long-term recovery.  Whether you are seeking resources for treatment, transportation, housing, job training, education, health care or other pathways to recovery, [facility] is a great place to start. 480.380.9947 www.[facility].org    General Medication Instruction: See your medication papers for instructions. Take all medicines as directed.  Make no changes unless your doctor suggests them. Go to all your doctor visits.  Be sure to have all your required lab tests. This way, your medicines may be refilled on time. Do not use any drugs not prescribed by your provider. AA/NA and sponsors are excellent resources for support. Avoid alcohol at all costs!    Please Note:  If you have any questions at anytime after you are discharged please call the [facility], [place] detoxification [address] at [phone].  [facility], Behavioral Intake [phone]. Please take this discharge folder with you to all your follow up appointments, it contains your lab results, diagnosis, medication list and discharge recommendations. THANK YOU FOR CHOOSING THE [facility]

## 2018-07-20 NOTE — PROGRESS NOTES
"Pt insisting on discharge, does not want to wait for direct transfer to [facility] on Monday. Discusses risks of relapse with discharge and explained direct transfer as best option. Pt became agitated, \"I'm done with this place. You people are dicking me around just like everybody else I go and I want out of here. The doctor just tapered me quick and refused to give me anything for my anxiety.\"  Pt acknowledged that he lied about being in a relationship with another patient.  Pt became verbally abusive, swearig and telling other patients that this nurse told him to go overdose.  I tried numerous times to redirect patient, indicating that I was explaining his risk of relapse and risk of overdose. I explained that he does have medications available for his anxiety and he had them this am, he also has PRN medications available. Pt was not redirectable, talking over this nurse, yelling and being verbally aggressive and upseting other patients on the unit. He was provided with the number to patient relations.  I spoke with Dr. Gallegos and received order to discharge AMA.  "

## 2021-05-06 DIAGNOSIS — Z83.79 FAMILY HISTORY OF GI DISORDER: ICD-10-CM

## 2021-05-06 DIAGNOSIS — R19.7 DIARRHEA: ICD-10-CM

## 2021-05-06 DIAGNOSIS — R10.9 ABDOMINAL PAIN, UNSPECIFIED ABDOMINAL LOCATION: Primary | ICD-10-CM

## 2021-05-06 DIAGNOSIS — R19.7 DIARRHEA, UNSPECIFIED TYPE: ICD-10-CM

## 2021-05-06 RX ORDER — SODIUM CHLORIDE 9 MG/ML
INJECTION, SOLUTION INTRAVENOUS CONTINUOUS
Status: CANCELLED | OUTPATIENT
Start: 2021-05-06

## 2021-05-06 RX ORDER — SODIUM CHLORIDE 0.9 % (FLUSH) 0.9 %
10 SYRINGE (ML) INJECTION
Status: CANCELLED | OUTPATIENT
Start: 2021-05-06

## 2021-05-25 ENCOUNTER — HOSPITAL ENCOUNTER (OUTPATIENT)
Dept: PREADMISSION TESTING | Facility: HOSPITAL | Age: 31
Discharge: HOME | End: 2021-05-25
Attending: SURGERY
Payer: MEDICAID

## 2021-05-25 ENCOUNTER — HOSPITAL ENCOUNTER (OUTPATIENT)
Dept: PULMONOLOGY | Facility: HOSPITAL | Age: 31
Discharge: HOME | End: 2021-05-25
Attending: SURGERY
Payer: MEDICAID

## 2021-05-25 VITALS — HEIGHT: 78 IN | BODY MASS INDEX: 28 KG/M2 | WEIGHT: 242 LBS

## 2021-05-25 DIAGNOSIS — R19.7 DIARRHEA, UNSPECIFIED TYPE: ICD-10-CM

## 2021-05-25 DIAGNOSIS — Z83.79 FAMILY HISTORY OF GI DISORDER: ICD-10-CM

## 2021-05-25 DIAGNOSIS — R10.9 ABDOMINAL PAIN, UNSPECIFIED ABDOMINAL LOCATION: ICD-10-CM

## 2021-05-25 DIAGNOSIS — Z01.818 PRE-OP TESTING: ICD-10-CM

## 2021-05-25 PROCEDURE — 93010 EKG 12-LEAD: ICD-10-PCS | Mod: ,,, | Performed by: INTERNAL MEDICINE

## 2021-05-25 PROCEDURE — 93005 ELECTROCARDIOGRAM TRACING: CPT

## 2021-05-25 PROCEDURE — 93010 ELECTROCARDIOGRAM REPORT: CPT | Mod: ,,, | Performed by: INTERNAL MEDICINE

## 2021-05-25 RX ORDER — DICLOFENAC SODIUM 75 MG/1
75 TABLET, DELAYED RELEASE ORAL 2 TIMES DAILY
COMMUNITY
Start: 2021-01-27 | End: 2021-05-25

## 2021-05-25 RX ORDER — ASPIRIN 81 MG/1
500 TABLET ORAL DAILY
COMMUNITY

## 2021-05-26 ENCOUNTER — ANESTHESIA EVENT (OUTPATIENT)
Dept: ENDOSCOPY | Facility: HOSPITAL | Age: 31
End: 2021-05-26
Payer: MEDICAID

## 2021-06-01 ENCOUNTER — ANESTHESIA (OUTPATIENT)
Dept: ENDOSCOPY | Facility: HOSPITAL | Age: 31
End: 2021-06-01
Payer: MEDICAID

## 2021-06-01 ENCOUNTER — HOSPITAL ENCOUNTER (OUTPATIENT)
Facility: HOSPITAL | Age: 31
Discharge: HOME | End: 2021-06-01
Attending: SURGERY | Admitting: SURGERY
Payer: MEDICAID

## 2021-06-01 VITALS
DIASTOLIC BLOOD PRESSURE: 71 MMHG | TEMPERATURE: 98 F | SYSTOLIC BLOOD PRESSURE: 121 MMHG | RESPIRATION RATE: 16 BRPM | HEART RATE: 60 BPM | OXYGEN SATURATION: 94 %

## 2021-06-01 DIAGNOSIS — K21.00 GASTROESOPHAGEAL REFLUX DISEASE WITH ESOPHAGITIS WITHOUT HEMORRHAGE: ICD-10-CM

## 2021-06-01 DIAGNOSIS — Z83.79 FAMILY HISTORY OF GI DISORDER: ICD-10-CM

## 2021-06-01 DIAGNOSIS — K29.70 GASTRITIS WITHOUT BLEEDING, UNSPECIFIED CHRONICITY, UNSPECIFIED GASTRITIS TYPE: ICD-10-CM

## 2021-06-01 DIAGNOSIS — R10.9 ABDOMINAL PAIN, UNSPECIFIED ABDOMINAL LOCATION: Primary | ICD-10-CM

## 2021-06-01 DIAGNOSIS — R19.7 DIARRHEA: ICD-10-CM

## 2021-06-01 DIAGNOSIS — K44.9 HIATAL HERNIA: ICD-10-CM

## 2021-06-01 PROCEDURE — 45378 DIAGNOSTIC COLONOSCOPY: CPT | Performed by: SURGERY

## 2021-06-01 PROCEDURE — 25000003 PHARM REV CODE 250: Performed by: SURGERY

## 2021-06-01 PROCEDURE — 43239 EGD BIOPSY SINGLE/MULTIPLE: CPT | Performed by: SURGERY

## 2021-06-01 PROCEDURE — 00813 ANES UPR LWR GI NDSC PX: CPT | Performed by: SURGERY

## 2021-06-01 PROCEDURE — 37000009 HC ANESTHESIA EA ADD 15 MINS: Performed by: SURGERY

## 2021-06-01 PROCEDURE — 37000008 HC ANESTHESIA 1ST 15 MINUTES: Performed by: SURGERY

## 2021-06-01 PROCEDURE — 27201423 OPTIME MED/SURG SUP & DEVICES STERILE SUPPLY: Performed by: SURGERY

## 2021-06-01 PROCEDURE — 63600175 PHARM REV CODE 636 W HCPCS: Performed by: NURSE ANESTHETIST, CERTIFIED REGISTERED

## 2021-06-01 RX ORDER — PANTOPRAZOLE SODIUM 40 MG/1
40 TABLET, DELAYED RELEASE ORAL DAILY
Qty: 30 TABLET | Refills: 3 | Status: SHIPPED | OUTPATIENT
Start: 2021-06-01 | End: 2022-06-01

## 2021-06-01 RX ORDER — SODIUM CHLORIDE 9 MG/ML
INJECTION, SOLUTION INTRAVENOUS CONTINUOUS
Status: DISCONTINUED | OUTPATIENT
Start: 2021-06-01 | End: 2021-06-01 | Stop reason: HOSPADM

## 2021-06-01 RX ORDER — SODIUM CHLORIDE, SODIUM LACTATE, POTASSIUM CHLORIDE, CALCIUM CHLORIDE 600; 310; 30; 20 MG/100ML; MG/100ML; MG/100ML; MG/100ML
INJECTION, SOLUTION INTRAVENOUS CONTINUOUS PRN
Status: DISCONTINUED | OUTPATIENT
Start: 2021-06-01 | End: 2021-06-01

## 2021-06-01 RX ORDER — LUBIPROSTONE 24 UG/1
24 CAPSULE ORAL 2 TIMES DAILY
Qty: 60 CAPSULE | Refills: 11 | Status: SHIPPED | OUTPATIENT
Start: 2021-06-01

## 2021-06-01 RX ORDER — SODIUM CHLORIDE 0.9 % (FLUSH) 0.9 %
10 SYRINGE (ML) INJECTION
Status: DISCONTINUED | OUTPATIENT
Start: 2021-06-01 | End: 2021-06-01 | Stop reason: HOSPADM

## 2021-06-01 RX ORDER — PROPOFOL 10 MG/ML
VIAL (ML) INTRAVENOUS
Status: DISCONTINUED | OUTPATIENT
Start: 2021-06-01 | End: 2021-06-01

## 2021-06-01 RX ADMIN — TOPICAL ANESTHETIC 2 EACH: 200 SPRAY DENTAL; PERIODONTAL at 01:06

## 2021-06-01 RX ADMIN — Medication 50 MG: at 01:06

## 2021-06-01 RX ADMIN — SODIUM CHLORIDE: 0.9 INJECTION, SOLUTION INTRAVENOUS at 11:06

## 2021-06-01 RX ADMIN — Medication 100 MG: at 01:06

## 2021-06-01 RX ADMIN — SODIUM CHLORIDE, SODIUM LACTATE, POTASSIUM CHLORIDE, AND CALCIUM CHLORIDE: 600; 310; 30; 20 INJECTION, SOLUTION INTRAVENOUS at 01:06

## 2021-06-04 LAB — SPECIMEN TO PATHOLOGY - SURGICAL: NORMAL

## (undated) DEVICE — SOL IRRI STRL WATER 1000ML

## (undated) DEVICE — UNDERPAD DISPOSABLE 30X30IN

## (undated) DEVICE — LINER SUCTION CANNISTER REGUGA

## (undated) DEVICE — Device

## (undated) DEVICE — TUBE SUC UNIVERSAL .25XIN 6FT

## (undated) DEVICE — TUBING OXYGEN CONNECT BUBBLE

## (undated) DEVICE — SYR SLIP TIP 60 CC DISP

## (undated) DEVICE — CONNECTOR TORRENT SCP OLYMPUS

## (undated) DEVICE — BITE BLOCK ADULT JUMBO ENDO W/

## (undated) DEVICE — SEE MEDLINE ITEM 153215

## (undated) DEVICE — KIT BIOGUARD AIR WTR SUC VALVE

## (undated) DEVICE — BASIN EMESIS GRAPHITE 500ML

## (undated) DEVICE — GOWN SURGICAL BRTHBL XL

## (undated) DEVICE — KIT VIA CUSTOM PROCEDURE

## (undated) DEVICE — SYS AQUASHIELD WATTER BOTTLE

## (undated) DEVICE — ELECTRODE FOAM 535 TEARDROP

## (undated) DEVICE — SPONGE DRY VIA GREEN